# Patient Record
Sex: MALE | Race: WHITE | Employment: FULL TIME | URBAN - METROPOLITAN AREA
[De-identification: names, ages, dates, MRNs, and addresses within clinical notes are randomized per-mention and may not be internally consistent; named-entity substitution may affect disease eponyms.]

---

## 2017-01-16 RX ORDER — LEVOTHYROXINE SODIUM 50 UG/1
50 TABLET ORAL
Qty: 30 TAB | Refills: 0 | Status: SHIPPED | OUTPATIENT
Start: 2017-01-16 | End: 2017-03-03 | Stop reason: SDUPTHER

## 2017-01-16 RX ORDER — LISINOPRIL AND HYDROCHLOROTHIAZIDE 20; 25 MG/1; MG/1
1 TABLET ORAL DAILY
Qty: 30 TAB | Refills: 0 | Status: SHIPPED | OUTPATIENT
Start: 2017-01-16 | End: 2017-03-03 | Stop reason: SDUPTHER

## 2017-02-07 ENCOUNTER — OFFICE VISIT (OUTPATIENT)
Dept: FAMILY MEDICINE CLINIC | Age: 47
End: 2017-02-07

## 2017-02-07 VITALS
DIASTOLIC BLOOD PRESSURE: 73 MMHG | WEIGHT: 287.4 LBS | TEMPERATURE: 98.5 F | RESPIRATION RATE: 20 BRPM | BODY MASS INDEX: 41.14 KG/M2 | OXYGEN SATURATION: 98 % | HEIGHT: 70 IN | SYSTOLIC BLOOD PRESSURE: 109 MMHG | HEART RATE: 68 BPM

## 2017-02-07 DIAGNOSIS — E03.9 ACQUIRED HYPOTHYROIDISM: ICD-10-CM

## 2017-02-07 DIAGNOSIS — R73.01 ELEVATED FASTING GLUCOSE: ICD-10-CM

## 2017-02-07 DIAGNOSIS — I10 ESSENTIAL HYPERTENSION: ICD-10-CM

## 2017-02-07 DIAGNOSIS — S56.911A FOREARM STRAIN, RIGHT, INITIAL ENCOUNTER: Primary | ICD-10-CM

## 2017-02-07 RX ORDER — BISMUTH SUBSALICYLATE 262 MG
1 TABLET,CHEWABLE ORAL DAILY
COMMUNITY

## 2017-02-07 NOTE — MR AVS SNAPSHOT
Visit Information Date & Time Provider Department Dept. Phone Encounter #  
 2/7/2017  3:15 PM [address] [place]  [phone] [id number] Follow-up Instructions Return in about 2 weeks (around 2/21/2017) for hypertension. Upcoming Health Maintenance Date Due Pneumococcal 19-64 Medium Risk (1 of 1 - PPSV23) 4/13/1988 DTaP/Tdap/Td series (1 - Tdap) 4/13/1990 INFLUENZA AGE 9 TO ADULT 8/1/2016 Allergies as of 2/7/2017  Review Complete On: 7/20/2016 By: [id number] Alireza Schneider MD  
  
 Severity Noted Reaction Type Reactions Codeine  12/01/2012    Other (comments) Current Immunizations  Never Reviewed No immunizations on file. Not reviewed this visit You Were Diagnosed With   
  
 Codes Comments Forearm strain, right, initial encounter    -  Primary ICD-10-CM: F69.528K ICD-9-CM: 841.9 Elevated fasting glucose     ICD-10-CM: R73.01 
ICD-9-CM: 790.21 Essential hypertension     ICD-10-CM: I10 
ICD-9-CM: 401.9 Acquired hypothyroidism     ICD-10-CM: E03.9 ICD-9-CM: 757. 9 Vitals BP Pulse Temp Resp Height(growth percentile) Weight(growth percentile) 109/73 (BP 1 Location: Right arm, BP Patient Position: Sitting) 68 98.5 °F (36.9 °C) (Oral) 20 5' 10\" (1.778 m) 287 lb 6.4 oz (130.4 kg) SpO2 BMI Smoking Status 98% 41.24 kg/m2 Never Smoker Vitals History BMI and BSA Data Body Mass Index Body Surface Area  
 41.24 kg/m 2 2.54 m 2 Preferred Pharmacy Pharmacy Name Phone [address], [address] [place] [address] [phone] Your Updated Medication List  
  
   
This list is accurate as of: 2/7/17  3:42 PM.  Always use your most recent med list.  
  
  
  
  
 albuterol 90 mcg/actuation inhaler Commonly known as:  PROAIR HFA Take 2 Puffs by inhalation every four (4) hours as needed for Wheezing or Shortness of Breath. apixaban 5 mg tablet Commonly known as:  Carmen Haver Take 5 mg by mouth two (2) times a day. fluticasone 110 mcg/actuation inhaler Commonly known as:  FLOVENT HFA Take 1 Puff by inhalation every twelve (12) hours. Indications: MAINTENANCE THERAPY FOR ASTHMA  
  
 levothyroxine 50 mcg tablet Commonly known as:  SYNTHROID Take 1 Tab by mouth Daily (before breakfast). lisinopril-hydroCHLOROthiazide 20-25 mg per tablet Commonly known as:  Stella Maximino Take 1 Tab by mouth daily. multivitamin tablet Commonly known as:  ONE A DAY Take 1 Tab by mouth daily. * naltrexone-buPROPion 8-90 mg Tber ER tablet Commonly known as:  Katharina Yaya Week 1 : 1 Tab AM, Week 2 : 1 Tab AM, 1 Tab PM, Week 3 : 2 Tab AM, 1 Tab PM, Week 4 : 2 Tab AM, 2 Tab PM  
  
 * naltrexone-buPROPion 8-90 mg Tber ER tablet Commonly known as:  Katharina [place] Take 2 Tabs by mouth two (2) times a day. * Notice: This list has 2 medication(s) that are the same as other medications prescribed for you. Read the directions carefully, and ask your doctor or other care provider to review them with you. Follow-up Instructions Return in about 2 weeks (around 2/21/2017) for hypertension. To-Do List   
 02/10/2017 Lab:  HEMOGLOBIN A1C WITH EAG   
  
 02/10/2017 Lab:  LIPID PANEL   
  
 02/10/2017 Lab:  METABOLIC PANEL, COMPREHENSIVE   
  
 02/10/2017 Lab:  TSH AND FREE T4 Patient Instructions Please ice the forearm at the elbow 10 minutes twice a day x 1 week, please do not lift greater than 10 pounds in the right arm, use Aleve 1 tab twice a day for the next week with food. Obtain a compression arm sleeve and wear during the day. Follow up in 2 weeks for hypertension exam and to check on the arm Naproxen (By mouth) Naproxen (na-PROX-en) Treats fever and pain. This medicine is an NSAID. Brand Name(s):Aleve, Aleve Arthritis, All Day Pain Relief, All Day Relief, Anaprox, Anaprox DS, EC Naprosyn, Flanax Pain Relief Kit, [facility] All Day Pain Relief, [facility] Sense All Day Pain Relief, Leader All Day Pain Relief, Mediproxen, Naprelan, NaproPak, NaproPax There may be other brand names for this medicine. When This Medicine Should Not Be Used: This medicine is not right for everyone. Do not use it if you had an allergic reaction (including asthma) to naproxen, aspirin, or other NSAID medicine. How to Use This Medicine:  
Liquid, Liquid Filled Capsule, Tablet, Coated Tablet, Long Acting Tablet · Your doctor will tell you how much medicine to use. Do not use more than directed. · Prescription-strength naproxen: This medicine should come with a Medication Guide. Ask your pharmacist for a copy if you do not have one. · Follow the instructions on the medicine label if you are using this medicine without a prescription. · Take this medicine with food or milk so it does not upset your stomach. Drink a full glass of water after each dose. · Delayed-release tablet:  Swallow whole. Do not crush, break, or chew it. · Oral liquid:  Shake well just before you measure the dose. Measure the oral liquid medicine with a marked measuring spoon, oral syringe, or medicine cup. · Missed dose: Take a dose as soon as you remember. If it is almost time for your next dose, wait until then and take a regular dose. Do not take extra medicine to make up for a missed dose. · Store the medicine in a closed container at room temperature, away from heat, moisture, and direct light. Oral liquid:  Do not freeze. Drugs and Foods to Avoid: Ask your doctor or pharmacist before using any other medicine, including over-the-counter medicines, vitamins, and herbal products. · Do not use any other NSAID medicine unless your doctor says it is okay. Some other NSAIDs are aspirin, diclofenac, ibuprofen, or celecoxib. · Some medicines and foods can affect how naproxen works.  Tell your doctor if you are using any of the following: ¨ Blood thinner (such as warfarin) ¨ Steroid medicine (such as hydrocortisone, methylprednisolone, prednisone, prednisolone, dexamethasone) ¨ Blood pressure medicine ¨ Lithium ¨ Methotrexate ¨ Probenecid · Ask your doctor before you use an antacid or stomach medicine. Warnings While Using This Medicine: · Tell your doctor if you are pregnant or breastfeeding. Do not use this medicine during the later part of pregnancy, unless your doctor tells you to. · Tell your doctor if you have kidney disease, liver disease, asthma, high blood pressure, congestive heart failure (CHF), other heart or blood problems, or a history of ulcers or digestion problems. Tell your doctor if you smoke or drink alcohol. · This medicine may cause the following problems: ¨ Bleeding and ulcers in the stomach or intestines ¨ Higher risk of heart attack or stroke ¨ Liver damage ¨ Kidney damage · Call your doctor if symptoms get worse, pain lasts more than 10 days, or fever lasts more than 3 days. · Tell any doctor or dentist who treats that you are using this medicine, especially if you have surgery or a procedure. · This medicine may make you dizzy or drowsy. Do not drive or do anything else that could be dangerous if you are not alert. · Tell any doctor or dentist who treats you that you are using this medicine. This medicine may affect certain medical test results. · Keep all medicine out of the reach of children. Never share your medicine with anyone. Possible Side Effects While Using This Medicine:  
Call your doctor right away if you notice any of these side effects: · Allergic reaction: Itching or hives, swelling in your face or hands, swelling or tingling in your mouth or throat, chest tightness, trouble breathing · Blistering, peeling, red skin rash · Change in how much or how often you urinate · Chest pain, trouble breathing, weakness on one side of your body, severe headache, trouble seeing or talking, pain in your lower leg · Chest pain that may spread, trouble breathing, nausea, unusual sweating, fainting · Dark urine or pale stools, nausea, vomiting, loss of appetite, stomach pain, yellow skin or eyes · Severe stomach pain, vomiting blood, bloody or black, tarry stools · Swelling in your hands, ankles, or feet, rapid weight gain · Unusual bleeding, bruising, or weakness · Vision changes If you notice these less serious side effects, talk with your doctor: · Mild nausea, diarrhea, or constipation · Ringing in your ears, dizziness, headache If you notice other side effects that you think are caused by this medicine, tell your doctor. Call your doctor for medical advice about side effects. You may report side effects to FDA at 2-148-JAL-7819 © 2016 [address] is for End User's use only and may not be sold, redistributed or otherwise used for commercial purposes. The above information is an  only. It is not intended as medical advice for individual conditions or treatments. Talk to your doctor, nurse or pharmacist before following any medical regimen to see if it is safe and effective for you. Introducing [facility]! Dear Oscar Helm: Thank you for requesting a [employer] account. Our records indicate that you already have an active [employer] account. You can access your account anytime at [email or web address]/[employer] Did you know that you can access your hospital and ER discharge instructions at any time in [employer]? You can also review all of your test results from your hospital stay or ER visit. Additional Information If you have questions, please visit the Frequently Asked Questions section of the [employer] website at [email or web address] Remember, [employer] is NOT to be used for urgent needs. For medical emergencies, dial 911. Now available from your iPhone and Android! Please provide this summary of care documentation to your next provider. Your primary care clinician is listed as Kemi Saunders. If you have any questions after today's visit, please call [phone].

## 2017-02-07 NOTE — PROGRESS NOTES
Krish Garcias is a 51 y.o. male in today with c/o right arm pain 4/10 s/p partial fall late December 2016. Learning assessment previously completed; primary language is [place]. 1. Have you been to the ER, urgent care clinic since your last visit? Hospitalized since your last visit? No    2. Have you seen or consulted any other health care providers outside of the [address] [place] [address] since your last visit? Include any pap smears or colon screening.  No

## 2017-02-07 NOTE — PATIENT INSTRUCTIONS
Please ice the forearm at the elbow 10 minutes twice a day x 1 week, please do not lift greater than 10 pounds in the right arm, use Aleve 1 tab twice a day for the next week with food. Obtain a compression arm sleeve and wear during the day. Follow up in 2 weeks for hypertension exam and to check on the arm    Naproxen (By mouth)   Naproxen (na-PROX-en)  Treats fever and pain. This medicine is an NSAID. Brand Name(s):Aleve, Aleve Arthritis, All Day Pain Relief, All Day Relief, Anaprox, Anaprox DS, EC Naprosyn, Flanax Pain Relief Kit, [facility] All Day Pain Relief, [facility] All Day Pain Relief, Leader All Day Pain Relief, Mediproxen, Naprelan, NaproPak, NaproPax   There may be other brand names for this medicine. When This Medicine Should Not Be Used: This medicine is not right for everyone. Do not use it if you had an allergic reaction (including asthma) to naproxen, aspirin, or other NSAID medicine. How to Use This Medicine:   Liquid, Liquid Filled Capsule, Tablet, Coated Tablet, Long Acting Tablet  · Your doctor will tell you how much medicine to use. Do not use more than directed. · Prescription-strength naproxen: This medicine should come with a Medication Guide. Ask your pharmacist for a copy if you do not have one. · Follow the instructions on the medicine label if you are using this medicine without a prescription. · Take this medicine with food or milk so it does not upset your stomach. Drink a full glass of water after each dose. · Delayed-release tablet:  Swallow whole. Do not crush, break, or chew it. · Oral liquid:  Shake well just before you measure the dose. Measure the oral liquid medicine with a marked measuring spoon, oral syringe, or medicine cup. · Missed dose: Take a dose as soon as you remember. If it is almost time for your next dose, wait until then and take a regular dose. Do not take extra medicine to make up for a missed dose.   · Store the medicine in a closed container at room temperature, away from heat, moisture, and direct light. Oral liquid:  Do not freeze. Drugs and Foods to Avoid:   Ask your doctor or pharmacist before using any other medicine, including over-the-counter medicines, vitamins, and herbal products. · Do not use any other NSAID medicine unless your doctor says it is okay. Some other NSAIDs are aspirin, diclofenac, ibuprofen, or celecoxib. · Some medicines and foods can affect how naproxen works. Tell your doctor if you are using any of the following:  ¨ Blood thinner (such as warfarin)  ¨ Steroid medicine (such as hydrocortisone, methylprednisolone, prednisone, prednisolone, dexamethasone)  ¨ Blood pressure medicine  ¨ Lithium  ¨ Methotrexate  ¨ Probenecid  · Ask your doctor before you use an antacid or stomach medicine. Warnings While Using This Medicine:   · Tell your doctor if you are pregnant or breastfeeding. Do not use this medicine during the later part of pregnancy, unless your doctor tells you to. · Tell your doctor if you have kidney disease, liver disease, asthma, high blood pressure, congestive heart failure (CHF), other heart or blood problems, or a history of ulcers or digestion problems. Tell your doctor if you smoke or drink alcohol. · This medicine may cause the following problems:  ¨ Bleeding and ulcers in the stomach or intestines  ¨ Higher risk of heart attack or stroke  ¨ Liver damage  ¨ Kidney damage  · Call your doctor if symptoms get worse, pain lasts more than 10 days, or fever lasts more than 3 days. · Tell any doctor or dentist who treats that you are using this medicine, especially if you have surgery or a procedure. · This medicine may make you dizzy or drowsy. Do not drive or do anything else that could be dangerous if you are not alert. · Tell any doctor or dentist who treats you that you are using this medicine. This medicine may affect certain medical test results.   · Keep all medicine out of the reach of children. Never share your medicine with anyone. Possible Side Effects While Using This Medicine:   Call your doctor right away if you notice any of these side effects:  · Allergic reaction: Itching or hives, swelling in your face or hands, swelling or tingling in your mouth or throat, chest tightness, trouble breathing  · Blistering, peeling, red skin rash  · Change in how much or how often you urinate  · Chest pain, trouble breathing, weakness on one side of your body, severe headache, trouble seeing or talking, pain in your lower leg  · Chest pain that may spread, trouble breathing, nausea, unusual sweating, fainting  · Dark urine or pale stools, nausea, vomiting, loss of appetite, stomach pain, yellow skin or eyes  · Severe stomach pain, vomiting blood, bloody or black, tarry stools  · Swelling in your hands, ankles, or feet, rapid weight gain  · Unusual bleeding, bruising, or weakness  · Vision changes  If you notice these less serious side effects, talk with your doctor:   · Mild nausea, diarrhea, or constipation  · Ringing in your ears, dizziness, headache  If you notice other side effects that you think are caused by this medicine, tell your doctor. Call your doctor for medical advice about side effects. You may report side effects to FDA at 8-092-FDA-2900  © 2016 [address] is for End User's use only and may not be sold, redistributed or otherwise used for commercial purposes. The above information is an  only. It is not intended as medical advice for individual conditions or treatments. Talk to your doctor, nurse or pharmacist before following any medical regimen to see if it is safe and effective for you.

## 2017-02-08 NOTE — PROGRESS NOTES
Arm Injury (right)        HPI: Soraya Elaine is a 51 y.o. male WHITE [place] , right hand dominant,here with right forearm pain after stumbling about 6 weeks ago down some stairs and grabbing the stair rail with the right arm to prevent his fall. He reports the pain has improved but he can still get shooting pains from the base of the thumb up the forearm with simple activities such as pushing a button on his remote control. He has tried to avoid lifting heavy objects with the right arm but has not tried other intervention. Past Medical History   Diagnosis Date    Asthma     [facility]deep venous thrombosis) ([facility]      2005    [facility]     [facility]ism ([facility]      2005; s/p IVC filter which was removed     Subdural hematoma ([facility])        Current Outpatient Prescriptions   Medication Sig    multivitamin (ONE A DAY) tablet Take 1 Tab by mouth daily.  lisinopril-hydroCHLOROthiazide (PRINZIDE, ZESTORETIC) 20-25 mg per tablet Take 1 Tab by mouth daily.  levothyroxine (SYNTHROID) 50 mcg tablet Take 1 Tab by mouth Daily (before breakfast).  fluticasone (FLOVENT HFA) 110 mcg/actuation inhaler Take 1 Puff by inhalation every twelve (12) hours. Indications: MAINTENANCE THERAPY FOR ASTHMA    apixaban (ELIQUIS) 5 mg tablet Take 5 mg by mouth two (2) times a day.  albuterol (PROAIR HFA) 90 mcg/actuation inhaler Take 2 Puffs by inhalation every four (4) hours as needed for Wheezing or Shortness of Breath.  naltrexone-buPROPion (CONTRAVE) 8-90 mg TbER ER tablet Week 1 : 1 Tab AM, Week 2 : 1 Tab AM, 1 Tab PM, Week 3 : 2 Tab AM, 1 Tab PM, Week 4 : 2 Tab AM, 2 Tab PM    naltrexone-buPROPion (CONTRAVE) 8-90 mg TbER ER tablet Take 2 Tabs by mouth two (2) times a day. No current facility-administered medications for this visit.         Allergies   Allergen Reactions    Codeine Other (comments)       Past Surgical History   Procedure Laterality Date    Hx craniotomy  2005    Hx knee arthroscopy Right 1992, 1994       Family History   Problem Relation Age of Onset    Cancer Mother      breast and bone    Heart Disease Paternal Grandfather     Hypertension Paternal Grandfather     Stroke Paternal Grandfather        Social History     Social History    Marital status:      Spouse name: N/A    Number of children: N/A    Years of education: N/A     Occupational History     [employer]     Social History Main Topics    Smoking status: Never Smoker    Smokeless tobacco: Never Used    Alcohol use No    Drug use: No    Sexual activity: Not on file     Other Topics Concern    Not on file     Social History Narrative       Review of Systems   Musculoskeletal: Positive for myalgias. Skin: Negative for rash. Visit Vitals    /73 (BP 1 Location: Right arm, BP Patient Position: Sitting)    Pulse 68    Temp 98.5 °F (36.9 °C) (Oral)    Resp 20    Ht 5' 10\" (1.778 m)    Wt 287 lb 6.4 oz (130.4 kg)    SpO2 98%    BMI 41.24 kg/m2       Physical Exam   Constitutional: He appears well-developed and well-nourished. No distress. HENT:   Head: Normocephalic and atraumatic. Right Ear: External ear normal.   Left Ear: External ear normal.   Musculoskeletal:        Right forearm: He exhibits tenderness (mild tenderness distal to medial epicondyle, some muscular tenderness of forearm). He exhibits no bony tenderness, no swelling, no edema and no deformity. Bilateral wrist flexion/extension 5/5,  strength 5/5 bilaterally    Normal supination and pronation of forearm, katherin increase in pain in forearm with opposed pronation of forearm. Skin: Skin is warm and dry. He is not diaphoretic. Psychiatric: He has a normal mood and affect. Nursing note and vitals reviewed.       Lab Results   Component Value Date/Time    Sodium 139 07/15/2016 07:40 AM    Potassium 3.8 07/15/2016 07:40 AM    Chloride 105 07/15/2016 07:40 AM    CO2 28 07/15/2016 07:40 AM    Anion gap 6 07/15/2016 07:40 AM    Glucose 110 07/15/2016 07:40 AM    BUN 13 07/15/2016 07:40 AM    Creatinine 0.90 07/15/2016 07:40 AM    BUN/Creatinine ratio 14 07/15/2016 07:40 AM    GFR est AA >60 07/15/2016 07:40 AM    GFR est non-AA >60 07/15/2016 07:40 AM    Calcium 9.0 07/15/2016 07:40 AM    Bilirubin, total 0.4 07/15/2016 07:40 AM    AST (SGOT) 29 07/15/2016 07:40 AM    Alk. phosphatase 60 07/15/2016 07:40 AM    Protein, total 7.1 07/15/2016 07:40 AM    Albumin 3.9 07/15/2016 07:40 AM    Globulin 3.2 07/15/2016 07:40 AM    A-G Ratio 1.2 07/15/2016 07:40 AM    ALT (SGPT) 64 07/15/2016 07:40 AM       Assesment:  1. Forearm strain, right, initial encounter  No heavy lifting, nothing greater than 10 pounds, Aleve BID x 1 week, consider compression sleeve    2. Elevated fasting glucose  Elevated blood sugar on last CMP, patient did not return for follow up  - HEMOGLOBIN A1C WITH EAG; Future    3. Essential hypertension  Routine labs due  - METABOLIC PANEL, COMPREHENSIVE; Future  - LIPID PANEL; Future    4. Acquired hypothyroidism  Routine lab due  - TSH AND FREE T4; Future        I have discussed the diagnosis with the patient and the intended management  The patient has received an after-visit summary and questions were answered concerning future plans. I have discussed medication usage, side effects and warnings with the patient as well. I have reviewed the plan of care with the patient, accepted their input and they are in agreement with the treatment goals. Follow-up Disposition:  Return in about 2 weeks (around 2/21/2017) for hypertension.       Kemi Saunders MD                .

## 2017-02-13 ENCOUNTER — CLINICAL SUPPORT (OUTPATIENT)
Dept: FAMILY MEDICINE CLINIC | Age: 47
End: 2017-02-13

## 2017-02-13 ENCOUNTER — HOSPITAL ENCOUNTER (OUTPATIENT)
Dept: LAB | Age: 47
Discharge: HOME | End: 2017-02-13
Payer: COMMERCIAL

## 2017-02-13 DIAGNOSIS — E03.9 ACQUIRED HYPOTHYROIDISM: ICD-10-CM

## 2017-02-13 DIAGNOSIS — R73.01 ELEVATED FASTING GLUCOSE: ICD-10-CM

## 2017-02-13 DIAGNOSIS — I10 ESSENTIAL HYPERTENSION: ICD-10-CM

## 2017-02-13 DIAGNOSIS — D68.59 HYPERCOAGULABLE STATE (HCC): Primary | Chronic | ICD-10-CM

## 2017-02-13 LAB
ALBUMIN SERPL BCP-MCNC: 4 G/DL (ref 3.4–5)
ALBUMIN/GLOB SERPL: 1.4 {RATIO} (ref 0.8–1.7)
ALP SERPL-CCNC: 65 U/L (ref 45–117)
ALT SERPL-CCNC: 67 U/L (ref 16–61)
ANION GAP BLD CALC-SCNC: 10 MMOL/L (ref 3–18)
AST SERPL W P-5'-P-CCNC: 34 U/L (ref 15–37)
BILIRUB SERPL-MCNC: 0.3 MG/DL (ref 0.2–1)
BUN SERPL-MCNC: 13 MG/DL (ref 7–18)
BUN/CREAT SERPL: 13 (ref 12–20)
CALCIUM SERPL-MCNC: 8.6 MG/DL (ref 8.5–10.1)
CHLORIDE SERPL-SCNC: 105 MMOL/L (ref 100–108)
CHOLEST SERPL-MCNC: 172 MG/DL
CO2 SERPL-SCNC: 26 MMOL/L (ref 21–32)
CREAT SERPL-MCNC: 0.97 MG/DL (ref 0.6–1.3)
EST. AVERAGE GLUCOSE BLD GHB EST-MCNC: 131 MG/DL
GLOBULIN SER CALC-MCNC: 2.9 G/DL (ref 2–4)
GLUCOSE SERPL-MCNC: 96 MG/DL (ref 74–99)
HBA1C MFR BLD: 6.2 % (ref 4.2–5.6)
HDLC SERPL-MCNC: 41 MG/DL (ref 40–60)
HDLC SERPL: 4.2 {RATIO} (ref 0–5)
LDLC SERPL CALC-MCNC: 106.4 MG/DL (ref 0–100)
LIPID PROFILE,FLP: ABNORMAL
POTASSIUM SERPL-SCNC: 3.7 MMOL/L (ref 3.5–5.5)
PROT SERPL-MCNC: 6.9 G/DL (ref 6.4–8.2)
SODIUM SERPL-SCNC: 141 MMOL/L (ref 136–145)
T4 FREE SERPL-MCNC: 1 NG/DL (ref 0.7–1.5)
TRIGL SERPL-MCNC: 123 MG/DL (ref ?–150)
TSH SERPL DL<=0.05 MIU/L-ACNC: 2.53 UIU/ML (ref 0.36–3.74)
VLDLC SERPL CALC-MCNC: 24.6 MG/DL

## 2017-02-13 PROCEDURE — 80061 LIPID PANEL: CPT | Performed by: FAMILY MEDICINE

## 2017-02-13 PROCEDURE — 84439 ASSAY OF FREE THYROXINE: CPT | Performed by: FAMILY MEDICINE

## 2017-02-13 PROCEDURE — 80053 COMPREHEN METABOLIC PANEL: CPT | Performed by: FAMILY MEDICINE

## 2017-02-13 PROCEDURE — 83036 HEMOGLOBIN GLYCOSYLATED A1C: CPT | Performed by: FAMILY MEDICINE

## 2017-07-21 ENCOUNTER — TELEPHONE (OUTPATIENT)
Dept: FAMILY MEDICINE CLINIC | Age: 47
End: 2017-07-21

## 2017-07-21 NOTE — TELEPHONE ENCOUNTER
Called, patient at work, spoke with wife, left message for patient to call and schedule follow-up per provider's request.

## 2017-08-16 ENCOUNTER — OFFICE VISIT (OUTPATIENT)
Dept: FAMILY MEDICINE CLINIC | Age: 47
End: 2017-08-16

## 2017-08-16 ENCOUNTER — HOSPITAL ENCOUNTER (OUTPATIENT)
Dept: LAB | Age: 47
Discharge: HOME | End: 2017-08-16

## 2017-08-16 VITALS
WEIGHT: 293.8 LBS | TEMPERATURE: 99.3 F | RESPIRATION RATE: 16 BRPM | BODY MASS INDEX: 42.06 KG/M2 | HEIGHT: 70 IN | DIASTOLIC BLOOD PRESSURE: 85 MMHG | HEART RATE: 52 BPM | OXYGEN SATURATION: 98 % | SYSTOLIC BLOOD PRESSURE: 139 MMHG

## 2017-08-16 DIAGNOSIS — M54.50 CHRONIC LOW BACK PAIN WITHOUT SCIATICA, UNSPECIFIED BACK PAIN LATERALITY: ICD-10-CM

## 2017-08-16 DIAGNOSIS — R06.2 WHEEZING: ICD-10-CM

## 2017-08-16 DIAGNOSIS — G89.29 CHRONIC LOW BACK PAIN WITHOUT SCIATICA, UNSPECIFIED BACK PAIN LATERALITY: ICD-10-CM

## 2017-08-16 DIAGNOSIS — E03.9 ACQUIRED HYPOTHYROIDISM: ICD-10-CM

## 2017-08-16 DIAGNOSIS — I10 ESSENTIAL HYPERTENSION: ICD-10-CM

## 2017-08-16 DIAGNOSIS — J45.30 MILD PERSISTENT ASTHMA WITHOUT COMPLICATION: ICD-10-CM

## 2017-08-16 DIAGNOSIS — I10 ESSENTIAL HYPERTENSION: Primary | ICD-10-CM

## 2017-08-16 RX ORDER — LEVOTHYROXINE SODIUM 50 UG/1
50 TABLET ORAL
Qty: 90 TAB | Refills: 1 | Status: SHIPPED | OUTPATIENT
Start: 2017-08-16 | End: 2018-02-21

## 2017-08-16 RX ORDER — IBUPROFEN 800 MG/1
TABLET ORAL
COMMUNITY
End: 2017-08-16 | Stop reason: ALTCHOICE

## 2017-08-16 RX ORDER — LISINOPRIL AND HYDROCHLOROTHIAZIDE 20; 25 MG/1; MG/1
1 TABLET ORAL DAILY
Qty: 90 TAB | Refills: 1 | Status: SHIPPED | OUTPATIENT
Start: 2017-08-16 | End: 2018-01-17 | Stop reason: SDUPTHER

## 2017-08-16 RX ORDER — NAPROXEN SODIUM 220 MG
220 TABLET ORAL 2 TIMES DAILY WITH MEALS
COMMUNITY
End: 2017-08-16 | Stop reason: ALTCHOICE

## 2017-08-16 RX ORDER — FLUTICASONE PROPIONATE 110 UG/1
1 AEROSOL, METERED RESPIRATORY (INHALATION) EVERY 12 HOURS
Qty: 1 INHALER | Refills: 5 | Status: SHIPPED | OUTPATIENT
Start: 2017-08-16

## 2017-08-16 RX ORDER — ALBUTEROL SULFATE 90 UG/1
2 AEROSOL, METERED RESPIRATORY (INHALATION)
Qty: 1 INHALER | Refills: 5 | Status: SHIPPED | OUTPATIENT
Start: 2017-08-16

## 2017-08-16 RX ORDER — MELOXICAM 15 MG/1
15 TABLET ORAL DAILY
Qty: 30 TAB | Refills: 1 | Status: SHIPPED | OUTPATIENT
Start: 2017-08-16 | End: 2018-02-21

## 2017-08-16 NOTE — MR AVS SNAPSHOT
Visit Information Date & Time Provider Department Dept. Phone Encounter #  
 8/16/2017  3:30 PM Ronny Walker, [address] [phone] [id number] Follow-up Instructions Return in about 3 months (around 11/16/2017) for hypertension. Upcoming Health Maintenance Date Due DTaP/Tdap/Td series (1 - Tdap) 4/13/1990 INFLUENZA AGE 9 TO ADULT 8/1/2017 Allergies as of 8/16/2017  Review Complete On: 8/16/2017 By: Renée Rosas LPN Severity Noted Reaction Type Reactions Codeine  11/03/2008    Other (comments), Hives Current Immunizations  Never Reviewed No immunizations on file. Not reviewed this visit You Were Diagnosed With   
  
 Codes Comments Essential hypertension    -  Primary ICD-10-CM: I10 
ICD-9-CM: 401.9 Chronic low back pain without sciatica, unspecified back pain laterality     ICD-10-CM: M54.5, G89.29 ICD-9-CM: 724.2, 338.29 Wheezing     ICD-10-CM: R06.2 ICD-9-CM: 786.07   
 Mild persistent asthma without complication     GBU-43-EH: J45.30 ICD-9-CM: 493.90 Acquired hypothyroidism     ICD-10-CM: E03.9 ICD-9-CM: 568. 9 Vitals BP Pulse Temp Resp Height(growth percentile) Weight(growth percentile) 139/85 (BP 1 Location: Left arm, BP Patient Position: Sitting) (!) 52 99.3 °F (37.4 °C) (Oral) 16 5' 10\" (1.778 m) 293 lb 12.8 oz (133.3 kg) SpO2 BMI Smoking Status 98% 42.16 kg/m2 Never Smoker Vitals History BMI and BSA Data Body Mass Index Body Surface Area  
 42.16 kg/m 2 2.57 m 2 Preferred Pharmacy Pharmacy Name Phone [facility] [address] [place] [address], [address] [place] [address] [phone] Your Updated Medication List  
  
   
This list is accurate as of: 8/16/17  4:14 PM.  Always use your most recent med list.  
  
  
  
  
 albuterol 90 mcg/actuation inhaler Commonly known as:  PROAIR HFA  
 Take 2 Puffs by inhalation every four (4) hours as needed for Wheezing or Shortness of Breath. apixaban 5 mg tablet Commonly known as:  Lizzy [place] Take 5 mg by mouth two (2) times a day. fluticasone 110 mcg/actuation inhaler Commonly known as:  FLOVENT HFA Take 1 Puff by inhalation every twelve (12) hours. Indications: MAINTENANCE THERAPY FOR ASTHMA  
  
 levothyroxine 50 mcg tablet Commonly known as:  SYNTHROID Take 1 Tab by mouth Daily (before breakfast). lisinopril-hydroCHLOROthiazide 20-25 mg per tablet Commonly known as:  Janas [place]ukes Take 1 Tab by mouth daily. meloxicam 15 mg tablet Commonly known as:  MOBIC Take 1 Tab by mouth daily. multivitamin tablet Commonly known as:  ONE A DAY Take 1 Tab by mouth daily. Prescriptions Sent to Pharmacy Refills  
 levothyroxine (SYNTHROID) 50 mcg tablet 1 Sig: Take 1 Tab by mouth Daily (before breakfast). Class: Normal  
 Pharmacy: [address] Ph #: [phone] Route: Oral  
 albuterol (PROAIR HFA) 90 mcg/actuation inhaler 5 Sig: Take 2 Puffs by inhalation every four (4) hours as needed for Wheezing or Shortness of Breath. Class: Normal  
 Pharmacy: [address] Ph #: [phone] Route: Inhalation  
 lisinopril-hydroCHLOROthiazide (PRINZIDE, ZESTORETIC) 20-25 mg per tablet 1 Sig: Take 1 Tab by mouth daily. Class: Normal  
 Pharmacy: [address] Ph #: [phone] Route: Oral  
 fluticasone (FLOVENT HFA) 110 mcg/actuation inhaler 5 Sig: Take 1 Puff by inhalation every twelve (12) hours. Indications: MAINTENANCE THERAPY FOR ASTHMA Class: Normal  
 Pharmacy: [address] Ph #: [phone]  Route: Inhalation  
 meloxicam (MOBIC) 15 mg tablet 1  
 Sig: Take 1 Tab by mouth daily. Class: Normal  
 Pharmacy: [facility] [address] [facility], [address]  #: [phone] Route: Oral  
  
Follow-up Instructions Return in about 3 months (around 11/16/2017) for hypertension. To-Do List   
 08/16/2017 Lab:  METABOLIC PANEL, COMPREHENSIVE   
  
 08/16/2017 Lab:  TSH AND FREE T4 Patient Instructions Back Stretches: Exercises Your Care Instructions Here are some examples of exercises for stretching your back. Start each exercise slowly. Ease off the exercise if you start to have pain. Your doctor or physical therapist will tell you when you can start these exercises and which ones will work best for you. How to do the exercises Overhead stretch 1. Stand comfortably with your feet shoulder-width apart. 2. Looking straight ahead, raise both arms over your head and reach toward the ceiling. Do not allow your head to tilt back. 3. Hold for 15 to 30 seconds, then lower your arms to your sides. 4. Repeat 2 to 4 times. Side stretch 1. Stand comfortably with your feet shoulder-width apart. 2. Raise one arm over your head, and then lean to the other side. 3. Slide your hand down your leg as you let the weight of your arm gently stretch your side muscles. Hold for 15 to 30 seconds. 4. Repeat 2 to 4 times on each side. Press-up 1. Lie on your stomach, supporting your body with your forearms. 2. Press your elbows down into the floor to raise your upper back. As you do this, relax your stomach muscles and allow your back to arch without using your back muscles. As your press up, do not let your hips or pelvis come off the floor. 3. Hold for 15 to 30 seconds, then relax. 4. Repeat 2 to 4 times. Relax and rest 
 
1. Lie on your back with a rolled towel under your neck and a pillow under your knees. Extend your arms comfortably to your sides. 2. Relax and breathe normally. 3. Remain in this position for about 10 minutes. 4. If you can, do this 2 or 3 times each day. Follow-up care is a key part of your treatment and safety. Be sure to make and go to all appointments, and call your doctor if you are having problems. It's also a good idea to know your test results and keep a list of the medicines you take. Where can you learn more? Go to http://ilir-gustavo.info/. Enter X781 in the search box to learn more about \"Back Stretches: Exercises. \" Current as of: March 21, 2017 Content Version: 11.3 © 1986-4150 [employer]. Care instructions adapted under license by [employer] (which disclaims liability or warranty for this information). If you have questions about a medical condition or this instruction, always ask your healthcare professional. [address] any warranty or liability for your use of this information. Introducing [facility]! Dear Nate Patterson: Thank you for requesting a Pearlfection account. Our records indicate that you already have an active Pearlfection account. You can access your account anytime at [email or web address] Did you know that you can access your hospital and ER discharge instructions at any time in Pearlfection? You can also review all of your test results from your hospital stay or ER visit. Additional Information If you have questions, please visit the Frequently Asked Questions section of the Pearlfection website at [email or web address] Remember, Pearlfection is NOT to be used for urgent needs. For medical emergencies, dial 911. Now available from your iPhone and Android! Please provide this summary of care documentation to your next provider. Your primary care clinician is listed as Suezanne Hamman. If you have any questions after today's visit, please call [phone].

## 2017-08-16 NOTE — PATIENT INSTRUCTIONS

## 2017-08-17 ENCOUNTER — TELEPHONE (OUTPATIENT)
Dept: FAMILY MEDICINE CLINIC | Age: 47
End: 2017-08-17

## 2017-08-17 NOTE — TELEPHONE ENCOUNTER
Pharmacists calling with concerns of new Mobic rx while patient is currently on Eliquis. Please advise.

## 2017-08-17 NOTE — TELEPHONE ENCOUNTER
Yes I am aware of the risk. Patient advised of risk. Patient was taking Aleve and Ibuprofen previously regularly.     Thanks,  Francesca Sanchez MD

## 2017-08-20 PROBLEM — M54.50 CHRONIC LOW BACK PAIN WITHOUT SCIATICA: Status: ACTIVE | Noted: 2017-08-20

## 2017-08-20 PROBLEM — G89.29 CHRONIC LOW BACK PAIN WITHOUT SCIATICA: Status: ACTIVE | Noted: 2017-08-20

## 2017-08-20 PROBLEM — I10 ESSENTIAL HYPERTENSION: Status: ACTIVE | Noted: 2017-08-20

## 2017-08-20 NOTE — PROGRESS NOTES
Hypertension        HPI: Bhargavi Moreno is a 49 y.o. male WHITE [place]   Here in follow up of his HTN. He has been off of his medcation for about 1 month now. He did not call in for refill. He does maintain a low salt diet. He does try to stay active. He does not engage in regular exercise. He has not used his thyroid hormone in the last 1 month. He has not noticed any change in how he feels    He reports recurrent low back pain without radiation. He has been using ibuprofen and aleve with minimal relief. Past Medical History:   Diagnosis Date    Asthma     [facility] thrombosis) ([facility]     2005    Hypercoagulable state ([facility]     [facility]     2005; s/p IVC filter which was removed     Subdural hematoma ([facility])        Current Outpatient Prescriptions   Medication Sig    levothyroxine (SYNTHROID) 50 mcg tablet Take 1 Tab by mouth Daily (before breakfast).  albuterol (PROAIR HFA) 90 mcg/actuation inhaler Take 2 Puffs by inhalation every four (4) hours as needed for Wheezing or Shortness of Breath.  lisinopril-hydroCHLOROthiazide (PRINZIDE, ZESTORETIC) 20-25 mg per tablet Take 1 Tab by mouth daily.  fluticasone (FLOVENT HFA) 110 mcg/actuation inhaler Take 1 Puff by inhalation every twelve (12) hours. Indications: MAINTENANCE THERAPY FOR ASTHMA    meloxicam (MOBIC) 15 mg tablet Take 1 Tab by mouth daily.  multivitamin (ONE A DAY) tablet Take 1 Tab by mouth daily.  apixaban (ELIQUIS) 5 mg tablet Take 5 mg by mouth two (2) times a day. No current facility-administered medications for this visit.         Allergies   Allergen Reactions    Codeine Other (comments) and Hives       Past Surgical History:   Procedure Laterality Date    HX CRANIOTOMY  2005    HX KNEE ARTHROSCOPY Right 1992, 1994       Family History   Problem Relation Age of Onset    Cancer Mother      breast and bone    Heart Disease Paternal Grandfather     Hypertension Paternal Grandfather     Stroke Paternal Grandfather        Social History     Social History    Marital status:      Spouse name: N/A    Number of children: N/A    Years of education: N/A     Occupational History     [address]     Social History Main Topics    Smoking status: Never Smoker    Smokeless tobacco: Never Used    Alcohol use No    Drug use: No    Sexual activity: Not on file     Other Topics Concern    Not on file     Social History Narrative       Gen- No weight loss, No Malaise, No fatigue  Eyes- No dipoplia, blurry vision  CVS- No Chest pain, no palpitations, no edema  Resp- No Cough, SOB, OJEDA, Wheezing  Neuro- No headaches  Skin- No easy bruising, No rashes      Visit Vitals    /85 (BP 1 Location: Left arm, BP Patient Position: Sitting)    Pulse (!) 52    Temp 99.3 °F (37.4 °C) (Oral)    Resp 16    Ht 5' 10\" (1.778 m)    Wt 293 lb 12.8 oz (133.3 kg)    SpO2 98%    BMI 42.16 kg/m2       GEN- NAD, AAOx3  CVS- RRR, +S1, +S2, No Murmurs, No JVD  PULM- faint wheezing with forced expiration, otherwise normal exam  EXT- No edema  MSK- Lumbar spine- no bony TTP, no muscle spasms noted  NEURO-Normal Gait  PSYCH- Euthymic, normal afffect        Assesment:  1. Essential hypertension  Controlled. High end of normal. Prinzide refilled today. - METABOLIC PANEL, COMPREHENSIVE; Future    2. Chronic low back pain without sciatica, unspecified back pain laterality  Stop use of ibuprofen and Aleve. Use meloxicam PRN when pain severe. Patient advised of risk of bleeding  - meloxicam (MOBIC) 15 mg tablet; Take 1 Tab by mouth daily. Dispense: 30 Tab; Refill: 1    3. Wheezing  - albuterol (PROAIR HFA) 90 mcg/actuation inhaler; Take 2 Puffs by inhalation every four (4) hours as needed for Wheezing or Shortness of Breath. Dispense: 1 Inhaler; Refill: 5    4. Mild persistent asthma without complication  Feeling well.  Continue flovent  - fluticasone (FLOVENT HFA) 110 mcg/actuation inhaler; Take 1 Puff by inhalation every twelve (12) hours. Indications: MAINTENANCE THERAPY FOR ASTHMA  Dispense: 1 Inhaler; Refill: 5    5. Acquired hypothyroidism  Recheck labwork today to assess need for thyroid hormone  - levothyroxine (SYNTHROID) 50 mcg tablet; Take 1 Tab by mouth Daily (before breakfast). Dispense: 90 Tab; Refill: 1  - TSH AND FREE T4; Future        I have discussed the diagnosis with the patient and the intended management  The patient has received an after-visit summary and questions were answered concerning future plans. I have discussed medication usage, side effects and warnings with the patient as well. I have reviewed the plan of care with the patient, accepted their input and they are in agreement with the treatment goals. Follow-up Disposition:  Return in about 3 months (around 11/16/2017) for hypertension.       Mary Schuler MD                .

## 2017-08-23 ENCOUNTER — HOSPITAL ENCOUNTER (OUTPATIENT)
Dept: LAB | Age: 47
Discharge: HOME | End: 2017-08-23
Payer: COMMERCIAL

## 2017-08-23 ENCOUNTER — LAB ONLY (OUTPATIENT)
Dept: FAMILY MEDICINE CLINIC | Age: 47
End: 2017-08-23

## 2017-08-23 DIAGNOSIS — I10 ESSENTIAL HYPERTENSION: Primary | ICD-10-CM

## 2017-08-23 DIAGNOSIS — E03.9 ACQUIRED HYPOTHYROIDISM: ICD-10-CM

## 2017-08-23 DIAGNOSIS — I10 ESSENTIAL HYPERTENSION: ICD-10-CM

## 2017-08-23 LAB
ALBUMIN SERPL-MCNC: 3.9 G/DL (ref 3.4–5)
ALBUMIN/GLOB SERPL: 1.3 {RATIO} (ref 0.8–1.7)
ALP SERPL-CCNC: 60 U/L (ref 45–117)
ALT SERPL-CCNC: 61 U/L (ref 16–61)
ANION GAP SERPL CALC-SCNC: 8 MMOL/L (ref 3–18)
AST SERPL-CCNC: 29 U/L (ref 15–37)
BILIRUB SERPL-MCNC: 0.3 MG/DL (ref 0.2–1)
BUN SERPL-MCNC: 17 MG/DL (ref 7–18)
BUN/CREAT SERPL: 19 (ref 12–20)
CALCIUM SERPL-MCNC: 8.5 MG/DL (ref 8.5–10.1)
CHLORIDE SERPL-SCNC: 105 MMOL/L (ref 100–108)
CO2 SERPL-SCNC: 27 MMOL/L (ref 21–32)
CREAT SERPL-MCNC: 0.88 MG/DL (ref 0.6–1.3)
GLOBULIN SER CALC-MCNC: 3 G/DL (ref 2–4)
GLUCOSE SERPL-MCNC: 132 MG/DL (ref 74–99)
POTASSIUM SERPL-SCNC: 3.8 MMOL/L (ref 3.5–5.5)
PROT SERPL-MCNC: 6.9 G/DL (ref 6.4–8.2)
SODIUM SERPL-SCNC: 140 MMOL/L (ref 136–145)

## 2017-08-23 PROCEDURE — 80053 COMPREHEN METABOLIC PANEL: CPT | Performed by: FAMILY MEDICINE

## 2017-08-23 PROCEDURE — 84439 ASSAY OF FREE THYROXINE: CPT | Performed by: FAMILY MEDICINE

## 2017-08-23 NOTE — PROGRESS NOTES
Margi Durán is a 49 y.o. male in today for labs only. Patient tolerated well, in no apparent distress.

## 2017-08-25 LAB
T4 FREE SERPL-MCNC: 0.8 NG/DL (ref 0.7–1.5)
TSH SERPL DL<=0.05 MIU/L-ACNC: 2.66 UIU/ML (ref 0.36–3.74)

## 2017-11-20 ENCOUNTER — OFFICE VISIT (OUTPATIENT)
Dept: FAMILY MEDICINE CLINIC | Age: 47
End: 2017-11-20

## 2017-11-20 ENCOUNTER — HOSPITAL ENCOUNTER (OUTPATIENT)
Dept: LAB | Age: 47
Discharge: HOME | End: 2017-11-20
Payer: COMMERCIAL

## 2017-11-20 VITALS
OXYGEN SATURATION: 98 % | HEART RATE: 76 BPM | TEMPERATURE: 99.5 F | SYSTOLIC BLOOD PRESSURE: 124 MMHG | BODY MASS INDEX: 41.66 KG/M2 | HEIGHT: 70 IN | WEIGHT: 291 LBS | DIASTOLIC BLOOD PRESSURE: 78 MMHG | RESPIRATION RATE: 18 BRPM

## 2017-11-20 DIAGNOSIS — Z86.39 HISTORY OF HYPOTHYROIDISM: ICD-10-CM

## 2017-11-20 DIAGNOSIS — J20.9 ACUTE BRONCHITIS, UNSPECIFIED ORGANISM: ICD-10-CM

## 2017-11-20 DIAGNOSIS — Z23 ENCOUNTER FOR IMMUNIZATION: ICD-10-CM

## 2017-11-20 DIAGNOSIS — I10 ESSENTIAL HYPERTENSION: Primary | ICD-10-CM

## 2017-11-20 LAB
T4 FREE SERPL-MCNC: 0.9 NG/DL (ref 0.7–1.5)
TSH SERPL DL<=0.05 MIU/L-ACNC: 2.18 UIU/ML (ref 0.36–3.74)

## 2017-11-20 PROCEDURE — 84439 ASSAY OF FREE THYROXINE: CPT | Performed by: FAMILY MEDICINE

## 2017-11-20 RX ORDER — AZITHROMYCIN 250 MG/1
TABLET, FILM COATED ORAL
Qty: 6 TAB | Refills: 0 | Status: SHIPPED | OUTPATIENT
Start: 2017-11-20 | End: 2017-11-25

## 2017-11-20 NOTE — PATIENT INSTRUCTIONS
Please make sure to get lots of rest, drink plenty of fluids at least eight 8-ounce glasses daily. Please start Vitamin C supplement of 1000mg daily and a daily multivitamin with zinc in it or a zinc supplementation. Use warm mist vaporizer/ humidifier and Vicks vaporub around the nose to help open up your nasal passages. Delsym is a very good cough syrup. Use ibuprofen as needed for pain and fevers. Please follow up if you are not improving in 1 week or if your symptoms change. DASH Diet: Care Instructions  Your Care Instructions    The DASH diet is an eating plan that can help lower your blood pressure. DASH stands for Dietary Approaches to Stop Hypertension. Hypertension is high blood pressure. The DASH diet focuses on eating foods that are high in calcium, potassium, and magnesium. These nutrients can lower blood pressure. The foods that are highest in these nutrients are fruits, vegetables, low-fat dairy products, nuts, seeds, and legumes. But taking calcium, potassium, and magnesium supplements instead of eating foods that are high in those nutrients does not have the same effect. The DASH diet also includes whole grains, fish, and poultry. The DASH diet is one of several lifestyle changes your doctor may recommend to lower your high blood pressure. Your doctor may also want you to decrease the amount of sodium in your diet. Lowering sodium while following the DASH diet can lower blood pressure even further than just the DASH diet alone. Follow-up care is a key part of your treatment and safety. Be sure to make and go to all appointments, and call your doctor if you are having problems. It's also a good idea to know your test results and keep a list of the medicines you take. How can you care for yourself at home? Following the DASH diet  · Eat 4 to 5 servings of fruit each day.  A serving is 1 medium-sized piece of fruit, ½ cup chopped or canned fruit, 1/4 cup dried fruit, or 4 ounces (½ cup) of fruit juice. Choose fruit more often than fruit juice. · Eat 4 to 5 servings of vegetables each day. A serving is 1 cup of lettuce or raw leafy vegetables, ½ cup of chopped or cooked vegetables, or 4 ounces (½ cup) of vegetable juice. Choose vegetables more often than vegetable juice. · Get 2 to 3 servings of low-fat and fat-free dairy each day. A serving is 8 ounces of milk, 1 cup of yogurt, or 1 ½ ounces of cheese. · Eat 6 to 8 servings of grains each day. A serving is 1 slice of bread, 1 ounce of dry cereal, or ½ cup of cooked rice, pasta, or cooked cereal. Try to choose whole-grain products as much as possible. · Limit lean meat, poultry, and fish to 2 servings each day. A serving is 3 ounces, about the size of a deck of cards. · Eat 4 to 5 servings of nuts, seeds, and legumes (cooked dried beans, lentils, and split peas) each week. A serving is 1/3 cup of nuts, 2 tablespoons of seeds, or ½ cup of cooked beans or peas. · Limit fats and oils to 2 to 3 servings each day. A serving is 1 teaspoon of vegetable oil or 2 tablespoons of salad dressing. · Limit sweets and added sugars to 5 servings or less a week. A serving is 1 tablespoon jelly or jam, ½ cup sorbet, or 1 cup of lemonade. · Eat less than 2,300 milligrams (mg) of sodium a day. If you limit your sodium to 1,500 mg a day, you can lower your blood pressure even more. Tips for success  · Start small. Do not try to make dramatic changes to your diet all at once. You might feel that you are missing out on your favorite foods and then be more likely to not follow the plan. Make small changes, and stick with them. Once those changes become habit, add a few more changes. · Try some of the following:  ¨ Make it a goal to eat a fruit or vegetable at every meal and at snacks. This will make it easy to get the recommended amount of fruits and vegetables each day. ¨ Try yogurt topped with fruit and nuts for a snack or healthy dessert.   ¨ Add lettuce, tomato, cucumber, and onion to sandwiches. ¨ Combine a ready-made pizza crust with low-fat mozzarella cheese and lots of vegetable toppings. Try using tomatoes, squash, spinach, broccoli, carrots, cauliflower, and onions. ¨ Have a variety of cut-up vegetables with a low-fat dip as an appetizer instead of chips and dip. ¨ Sprinkle sunflower seeds or chopped almonds over salads. Or try adding chopped walnuts or almonds to cooked vegetables. ¨ Try some vegetarian meals using beans and peas. Add garbanzo or kidney beans to salads. Make burritos and tacos with mashed dyson beans or black beans. Where can you learn more? Go to http://ilir-gustavo.info/. Enter G583 in the search box to learn more about \"DASH Diet: Care Instructions. \"  Current as of: September 21, 2016  Content Version: 11.4  © 3611-6859 [employer]wise, [employer]. Care instructions adapted under license by [employer] (which disclaims liability or warranty for this information). If you have questions about a medical condition or this instruction, always ask your healthcare professional. [place] [address] any warranty or liability for your use of this information.

## 2017-11-20 NOTE — MR AVS SNAPSHOT
Visit Information Date & Time Provider Department Dept. Phone Encounter #  
 11/20/2017  9:00 AM [address], [facility] [employer] [phone] [id number] Upcoming Health Maintenance Date Due Pneumococcal 19-64 Medium Risk (1 of 1 - PPSV23) 4/13/1988 DTaP/Tdap/Td series (1 - Tdap) 4/13/1990 Influenza Age 5 to Adult 8/1/2017 Allergies as of 11/20/2017  Review Complete On: 11/20/2017 By: Amberly Brumfield LPN Severity Noted Reaction Type Reactions Codeine  11/03/2008    Other (comments), Hives Current Immunizations  Reviewed on 11/20/2017 Name Date Influenza Vaccine 10/1/2011 12:00 AM, 11/10/2010 12:00 AM  
 Influenza Vaccine (Quad) PF 11/20/2017  9:53 AM  
  
 Reviewed by Amberly Brumfield LPN on 18/55/0854 at  9:57 AM  
You Were Diagnosed With   
  
 Codes Comments Essential hypertension    -  Primary ICD-10-CM: I10 
ICD-9-CM: 401.9 Acute bronchitis, unspecified organism     ICD-10-CM: J20.9 ICD-9-CM: 466.0 History of hypothyroidism     ICD-10-CM: Z86.39 
ICD-9-CM: V12.29 Encounter for immunization     ICD-10-CM: T77 ICD-9-CM: V03.89 Vitals BP Pulse Temp Resp Height(growth percentile) Weight(growth percentile) 124/78 (BP 1 Location: Left arm, BP Patient Position: Sitting) 76 99.5 °F (37.5 °C) (Oral) 18 5' 10\" (1.778 m) 291 lb (132 kg) SpO2 BMI Smoking Status 98% 41.75 kg/m2 Never Smoker Vitals History BMI and BSA Data Body Mass Index Body Surface Area 41.75 kg/m 2 2.55 m 2 Preferred Pharmacy Pharmacy Name Phone [facility] [address] [place] [address], [address] [place] [address] [phone] Your Updated Medication List  
  
   
This list is accurate as of: 11/20/17  9:59 AM.  Always use your most recent med list.  
  
  
  
  
 albuterol 90 mcg/actuation inhaler Commonly known as:  PROAIR HFA  
 Take 2 Puffs by inhalation every four (4) hours as needed for Wheezing or Shortness of Breath. apixaban 5 mg tablet Commonly known as:  [place] Stade Take 5 mg by mouth two (2) times a day. azithromycin 250 mg tablet Commonly known as:  Unice Messing Take 2 tablets today, then take 1 tablet daily  
  
 fluticasone 110 mcg/actuation inhaler Commonly known as:  FLOVENT HFA Take 1 Puff by inhalation every twelve (12) hours. Indications: MAINTENANCE THERAPY FOR ASTHMA  
  
 levothyroxine 50 mcg tablet Commonly known as:  SYNTHROID Take 1 Tab by mouth Daily (before breakfast). lisinopril-hydroCHLOROthiazide 20-25 mg per tablet Commonly known as:  Dariela Hodgkin Take 1 Tab by mouth daily. meloxicam 15 mg tablet Commonly known as:  MOBIC Take 1 Tab by mouth daily. multivitamin tablet Commonly known as:  ONE A DAY Take 1 Tab by mouth daily. Prescriptions Sent to Pharmacy Refills  
 azithromycin (ZITHROMAX) 250 mg tablet 0 Sig: Take 2 tablets today, then take 1 tablet daily Class: Normal  
 Pharmacy: [address], [address] [facility]. Tanna Martinez 48 Ph #: [phone] We Performed the Following INFLUENZA VIRUS VAC QUAD,SPLIT,PRESV FREE SYRINGE IM [id number] CPT(R)] To-Do List   
 11/20/2017 Lab:  TSH AND FREE T4   
  
 03/19/2018 Lab:  LIPID PANEL   
  
 03/19/2018 Lab:  METABOLIC PANEL, COMPREHENSIVE Patient Instructions Please make sure to get lots of rest, drink plenty of fluids at least eight 8-ounce glasses daily. Please start Vitamin C supplement of 1000mg daily and a daily multivitamin with zinc in it or a zinc supplementation. Use warm mist vaporizer/ humidifier and Vicks vaporub around the nose to help open up your nasal passages. Delsym is a very good cough syrup. Use ibuprofen as needed for pain and fevers. Please follow up if you are not improving in 1 week or if your symptoms change. DASH Diet: Care Instructions Your Care Instructions The DASH diet is an eating plan that can help lower your blood pressure. DASH stands for Dietary Approaches to Stop Hypertension. Hypertension is high blood pressure. The DASH diet focuses on eating foods that are high in calcium, potassium, and magnesium. These nutrients can lower blood pressure. The foods that are highest in these nutrients are fruits, vegetables, low-fat dairy products, nuts, seeds, and legumes. But taking calcium, potassium, and magnesium supplements instead of eating foods that are high in those nutrients does not have the same effect. The DASH diet also includes whole grains, fish, and poultry. The DASH diet is one of several lifestyle changes your doctor may recommend to lower your high blood pressure. Your doctor may also want you to decrease the amount of sodium in your diet. Lowering sodium while following the DASH diet can lower blood pressure even further than just the DASH diet alone. Follow-up care is a key part of your treatment and safety. Be sure to make and go to all appointments, and call your doctor if you are having problems. It's also a good idea to know your test results and keep a list of the medicines you take. How can you care for yourself at home? Following the DASH diet · Eat 4 to 5 servings of fruit each day. A serving is 1 medium-sized piece of fruit, ½ cup chopped or canned fruit, 1/4 cup dried fruit, or 4 ounces (½ cup) of fruit juice. Choose fruit more often than fruit juice. · Eat 4 to 5 servings of vegetables each day. A serving is 1 cup of lettuce or raw leafy vegetables, ½ cup of chopped or cooked vegetables, or 4 ounces (½ cup) of vegetable juice. Choose vegetables more often than vegetable juice. · Get 2 to 3 servings of low-fat and fat-free dairy each day. A serving is 8 ounces of milk, 1 cup of yogurt, or 1 ½ ounces of cheese. · Eat 6 to 8 servings of grains each day. A serving is 1 slice of bread, 1 ounce of dry cereal, or ½ cup of cooked rice, pasta, or cooked cereal. Try to choose whole-grain products as much as possible. · Limit lean meat, poultry, and fish to 2 servings each day. A serving is 3 ounces, about the size of a deck of cards. · Eat 4 to 5 servings of nuts, seeds, and legumes (cooked dried beans, lentils, and split peas) each week. A serving is 1/3 cup of nuts, 2 tablespoons of seeds, or ½ cup of cooked beans or peas. · Limit fats and oils to 2 to 3 servings each day. A serving is 1 teaspoon of vegetable oil or 2 tablespoons of salad dressing. · Limit sweets and added sugars to 5 servings or less a week. A serving is 1 tablespoon jelly or jam, ½ cup sorbet, or 1 cup of lemonade. · Eat less than 2,300 milligrams (mg) of sodium a day. If you limit your sodium to 1,500 mg a day, you can lower your blood pressure even more. Tips for success · Start small. Do not try to make dramatic changes to your diet all at once. You might feel that you are missing out on your favorite foods and then be more likely to not follow the plan. Make small changes, and stick with them. Once those changes become habit, add a few more changes. · Try some of the following: ¨ Make it a goal to eat a fruit or vegetable at every meal and at snacks. This will make it easy to get the recommended amount of fruits and vegetables each day. ¨ Try yogurt topped with fruit and nuts for a snack or healthy dessert. ¨ Add lettuce, tomato, cucumber, and onion to sandwiches. ¨ Combine a ready-made pizza crust with low-fat mozzarella cheese and lots of vegetable toppings. Try using tomatoes, squash, spinach, broccoli, carrots, cauliflower, and onions. ¨ Have a variety of cut-up vegetables with a low-fat dip as an appetizer instead of chips and dip. ¨ Sprinkle sunflower seeds or chopped almonds over salads.  Or try adding chopped walnuts or almonds to cooked vegetables. ¨ Try some vegetarian meals using beans and peas. Add garbanzo or kidney beans to salads. Make burritos and tacos with mashed dyson beans or black beans. Where can you learn more? Go to http://ilir-gustavo.info/. Enter U234 in the search box to learn more about \"DASH Diet: Care Instructions. \" Current as of: September 21, 2016 Content Version: 11.4 © 9034-6224 [employer]. Care instructions adapted under license by [employer] (which disclaims liability or warranty for this information). If you have questions about a medical condition or this instruction, always ask your healthcare professional. [address] any warranty or liability for your use of this information. Introducing \[facility] [place] [facility]\"" & HEALTH SERVICES! Dear Riya Loya: Thank you for requesting a [facility] account. Our records indicate that you already have an active [facility] account. You can access your account anytime at [email or web address] Did you know that you can access your hospital and ER discharge instructions at any time in [facility]? You can also review all of your test results from your hospital stay or ER visit. Additional Information If you have questions, please visit the Frequently Asked Questions section of the [facility] website at [email or web address] Remember, [facility] is NOT to be used for urgent needs. For medical emergencies, dial 911. Now available from your iPhone and Android! Please provide this summary of care documentation to your next provider. Your primary care clinician is listed as Tripp Mehta. If you have any questions after today's visit, please call [phone].

## 2017-11-20 NOTE — PROGRESS NOTES
Hypertension Follow Up Progress Note      Today's Date:  2017   Patient's Name: Olena Rodriguez   Patient's :  1970     Subjective:     Olena Rodriguez is a 49 y.o. male who presents for follow up of hypertension. Diet and Lifestyle: generally follows a low sodium diet, sedentary, nonsmoker  Home BP Monitoring: is not measured at home    Cardiovascular ROS: taking medications as instructed, no medication side effects noted, no TIA's, no chest pain on exertion, no swelling of ankles. New concerns: Cold symptoms x 1 week, productive cough- light green sputum. Feeling chest congestion. 1 day of feeling feverish.       Recurrent low back pain-  Seeing Dr Alanna Varma, [facility]    Due for recheck on thyroid levels to ensure continued euthyroid after discontinuing levothyroxine    Review of Systems:   Gen- No weight loss, No Malaise, No fatigue  Eyes- No dipoplia, blurry vision  CVS- No Chest pain, no palpitations, no edema  Neuro- No headaches  Skin- No easy bruising, No rashes      Hemoglobin A1c   Date Value Ref Range Status   2017 6.2 (H) 4.2 - 5.6 % Final     Comment:     (NOTE)  HbA1C Interpretive Ranges  <5.7              Normal  5.7 - 6.4         Consider Prediabetes  >6.5              Consider Diabetes       BUN   Date Value Ref Range Status   2017 17 7.0 - 18 MG/DL Final     Creatinine   Date Value Ref Range Status   2017 0.88 0.6 - 1.3 MG/DL Final     GFR est AA   Date Value Ref Range Status   2017 >60 >60 ml/min/1.73m2 Final     Hemoglobin A1c   Date Value Ref Range Status   2017 6.2 (H) 4.2 - 5.6 % Final     Comment:     (NOTE)  HbA1C Interpretive Ranges  <5.7              Normal  5.7 - 6.4         Consider Prediabetes  >6.5              Consider Diabetes       BUN   Date Value Ref Range Status   2017 17 7.0 - 18 MG/DL Final     Creatinine   Date Value Ref Range Status   2017 0.88 0.6 - 1.3 MG/DL Final     GFR est AA   Date Value Ref Range Status 08/23/2017 >60 >60 ml/min/1.73m2 Final       Objective:     Visit Vitals    /78 (BP 1 Location: Left arm, BP Patient Position: Sitting)    Pulse 76    Temp 99.5 °F (37.5 °C) (Oral)    Resp 18    Ht 5' 10\" (1.778 m)    Wt 291 lb (132 kg)    SpO2 98%    BMI 41.75 kg/m2     Appearance: alert, well appearing, and in no distress and oriented to person, place, and time. Cardiovascular: Normal rate and regular rhythm, no gallop, no murmur., S1, S2 normal and no JVD   Pulmonary/Chest: Effort normal  No respiratory distress. + rales RLL  Lower Extremities (feet): warm, good capillary refill  Lab review: most recent lipid panel reviewed, showing LDL result does not yet meet goal.   CVS exam BP noted to be well controlled today in office,    Assessment/Plan:   hypertension stable. current treatment plan is effective, no change in therapy  lab results and schedule of future lab studies reviewed with patient. hypertension well controlled. current treatment plan is effective, no change in therapy. ICD-10-CM ICD-9-CM    1. Essential hypertension I10 401.9 LIPID PANEL      METABOLIC PANEL, COMPREHENSIVE   2. Acute bronchitis, unspecified organism J20.9 466.0 azithromycin (ZITHROMAX) 250 mg tablet   3. History of hypothyroidism Z86.39 V12.29 TSH AND FREE T4         Recommendations:  Limit sodium < 1500 mg/day  DASH diet  Wt reduction and maintenance  Exercise- 30 min 5 days/wk with intense 20 min 3 days/wk  Moderation of Alcohol intake: 1 serving/day, 5/wk -female, 2 servings/day  9/wk-male  Do not smoke  Avoid NSAIDs, pseudoephedrine, phenylephrine and herbal suppliments such as bitter orange, ginsing, shashi's wort, licorice, caffeine pills. Discussed with patient at length the need for blood pressure re-evaluation and management with primary care. Discussed the long term sequelae for elevated blood pressure including blindness, CVA, MI, and renal failure/ dialysis. Pt agrees to follow up as directed. [address], [place]

## 2018-02-21 ENCOUNTER — OFFICE VISIT (OUTPATIENT)
Dept: FAMILY MEDICINE CLINIC | Age: 48
End: 2018-02-21

## 2018-02-21 VITALS
HEIGHT: 70 IN | WEIGHT: 296 LBS | DIASTOLIC BLOOD PRESSURE: 66 MMHG | RESPIRATION RATE: 18 BRPM | HEART RATE: 58 BPM | SYSTOLIC BLOOD PRESSURE: 122 MMHG | TEMPERATURE: 98.9 F | BODY MASS INDEX: 42.37 KG/M2 | OXYGEN SATURATION: 98 %

## 2018-02-21 DIAGNOSIS — R73.03 PREDIABETES: ICD-10-CM

## 2018-02-21 DIAGNOSIS — E66.01 MORBID OBESITY (HCC): ICD-10-CM

## 2018-02-21 DIAGNOSIS — I10 ESSENTIAL HYPERTENSION: Primary | ICD-10-CM

## 2018-02-21 NOTE — PROGRESS NOTES
Hypertension Follow Up Progress Note      Today's Date:  2018   Patient's Name: Puneet Grijalva   Patient's :  1970     Subjective:     Puneet Grijalva is a 49 y.o. male who presents for follow up of hypertension. Diet and Lifestyle: generally follows a low sodium diet, nonsmoker, Uses stationary bike twice a week for 15 minutes  Home BP Monitoring: is not measured at home    Cardiovascular ROS: taking medications as instructed, no medication side effects noted, no TIA's, no chest pain on exertion, no dyspnea on exertion, no swelling of ankles. New concerns: We had discussed weight at previous visits, he reports that food portions are larger than they should be. Stress provokes unhealthy eating for him. He feels that he can make better choices in regards to this.         Review of Systems:       Hemoglobin A1c   Date Value Ref Range Status   2017 6.2 (H) 4.2 - 5.6 % Final     Comment:     (NOTE)  HbA1C Interpretive Ranges  <5.7              Normal  5.7 - 6.4         Consider Prediabetes  >6.5              Consider Diabetes       BUN   Date Value Ref Range Status   2017 17 7.0 - 18 MG/DL Final     Creatinine   Date Value Ref Range Status   2017 0.88 0.6 - 1.3 MG/DL Final     GFR est AA   Date Value Ref Range Status   2017 >60 >60 ml/min/1.73m2 Final     Hemoglobin A1c   Date Value Ref Range Status   2017 6.2 (H) 4.2 - 5.6 % Final     Comment:     (NOTE)  HbA1C Interpretive Ranges  <5.7              Normal  5.7 - 6.4         Consider Prediabetes  >6.5              Consider Diabetes       BUN   Date Value Ref Range Status   2017 17 7.0 - 18 MG/DL Final     Creatinine   Date Value Ref Range Status   2017 0.88 0.6 - 1.3 MG/DL Final     GFR est AA   Date Value Ref Range Status   2017 >60 >60 ml/min/1.73m2 Final       Objective:     Visit Vitals    /66 (BP 1 Location: Left arm, BP Patient Position: Sitting)    Pulse (!) 58    Temp 98.9 °F (37.2 °C) (Oral)    Resp 18    Ht 5' 10\" (1.778 m)    Wt 296 lb (134.3 kg)    SpO2 98%    BMI 42.47 kg/m2     Appearance: alert, well appearing, and in no distress and oriented to person, place, and time. Cardiovascular: Normal rate and regular rhythm, no gallop, no murmur., S1, S2 normal and no JVD   Pulmonary/Chest: Effort normal and breath sounds normal. No respiratory distress. No wheezes or rales  Lower Extremities (feet): warm, good capillary refill  Lab review: orders written for new lab studies as appropriate; see orders. CVS exam BP noted to be well controlled today in office,    Assessment/Plan:   hypertension stable. current treatment plan is effective, no change in therapy  lab results and schedule of future lab studies reviewed with patient. hypertension well controlled. current treatment plan is effective, no change in therapy. ICD-10-CM ICD-9-CM    1. Essential hypertension I10 401.9    2. Morbid obesity ([facility] [address] E66.01 278.01    3. Prediabetes R73.03 790.29      Reports hematologist at [facility] has drawn recent labs. Will obtain these results prior to ordering labs; likely will need lipid and A1c. This was discussed with patient. Recommendations:  Limit sodium < 1500 mg/day  DASH diet  Wt reduction and maintenance  Exercise- 30 min 5 days/wk with intense 20 min 3 days/wk  Moderation of Alcohol intake: 1 serving/day, 5/wk -female, 2 servings/day  9/wk-male  Do not smoke  Avoid NSAIDs, pseudoephedrine, phenylephrine and herbal suppliments such as bitter orange, ginsing, shashi's wort, licorice, caffeine pills. Discussed with patient at length the need for blood pressure re-evaluation and management with primary care. Discussed the long term sequelae for elevated blood pressure including blindness, CVA, MI, and renal failure/ dialysis. Pt agrees to follow up as directed. Follow-up Disposition:  Return in about 3 months (around 5/21/2018) for hypertension, weight.     Alhaji CASTRO Lisa Ro MD

## 2018-02-21 NOTE — PATIENT INSTRUCTIONS
Please engage in regular exercise. Moderate intensity aerobic exercise  (60-70% maximal heart rate) for at least 150 minutes per week spread over a minimum of 3 days. Do not take more than 2 days off from exercising. Activities such as brisk walking, jogging, bicycling and swimming. Please also engage in weight resistance exercises for at least 20 minutes twice a week. Please engage in practicing portion control per the plate method we discussed. Keep a low carbohydrate diet. 50% of your daily caloric intake. Also a high fiber diet of 30 grams daily. Body Mass Index: Care Instructions  Your Care Instructions    Body mass index (BMI) can help you see if your weight is raising your risk for health problems. It uses a formula to compare how much you weigh with how tall you are. · A BMI lower than 18.5 is considered underweight. · A BMI between 18.5 and 24.9 is considered healthy. · A BMI between 25 and 29.9 is considered overweight. A BMI of 30 or higher is considered obese. If your BMI is in the normal range, it means that you have a lower risk for weight-related health problems. If your BMI is in the overweight or obese range, you may be at increased risk for weight-related health problems, such as high blood pressure, heart disease, stroke, arthritis or joint pain, and diabetes. If your BMI is in the underweight range, you may be at increased risk for health problems such as fatigue, lower protection (immunity) against illness, muscle loss, bone loss, hair loss, and hormone problems. BMI is just one measure of your risk for weight-related health problems. You may be at higher risk for health problems if you are not active, you eat an unhealthy diet, or you drink too much alcohol or use tobacco products. Follow-up care is a key part of your treatment and safety. Be sure to make and go to all appointments, and call your doctor if you are having problems.  It's also a good idea to know your test results and keep a list of the medicines you take. How can you care for yourself at home? · Practice healthy eating habits. This includes eating plenty of fruits, vegetables, whole grains, lean protein, and low-fat dairy. · If your doctor recommends it, get more exercise. Walking is a good choice. Bit by bit, increase the amount you walk every day. Try for at least 30 minutes on most days of the week. · Do not smoke. Smoking can increase your risk for health problems. If you need help quitting, talk to your doctor about stop-smoking programs and medicines. These can increase your chances of quitting for good. · Limit alcohol to 2 drinks a day for men and 1 drink a day for women. Too much alcohol can cause health problems. If you have a BMI higher than 25  · Your doctor may do other tests to check your risk for weight-related health problems. This may include measuring the distance around your waist. A waist measurement of more than 40 inches in men or 35 inches in women can increase the risk of weight-related health problems. · Talk with your doctor about steps you can take to stay healthy or improve your health. You may need to make lifestyle changes to lose weight and stay healthy, such as changing your diet and getting regular exercise. If you have a BMI lower than 18.5  · Your doctor may do other tests to check your risk for health problems. · Talk with your doctor about steps you can take to stay healthy or improve your health. You may need to make lifestyle changes to gain or maintain weight and stay healthy, such as getting more healthy foods in your diet and doing exercises to build muscle. Where can you learn more? Go to http://ilir-gustavo.info/. Enter S176 in the search box to learn more about \"Body Mass Index: Care Instructions. \"  Current as of: October 13, 2016  Content Version: 11.4  © 8346-8861 [employer], [employer].  Care instructions adapted under license by [employer] (which disclaims liability or warranty for this information). If you have questions about a medical condition or this instruction, always ask your healthcare professional. [address] any warranty or liability for your use of this information.

## 2018-04-18 ENCOUNTER — CLINICAL SUPPORT (OUTPATIENT)
Dept: FAMILY MEDICINE CLINIC | Age: 48
End: 2018-04-18

## 2018-04-18 ENCOUNTER — HOSPITAL ENCOUNTER (OUTPATIENT)
Dept: LAB | Age: 48
Discharge: HOME | End: 2018-04-18
Payer: COMMERCIAL

## 2018-04-18 DIAGNOSIS — I10 ESSENTIAL HYPERTENSION: ICD-10-CM

## 2018-04-18 DIAGNOSIS — Z01.89 ROUTINE LAB DRAW: ICD-10-CM

## 2018-04-18 DIAGNOSIS — R73.09 ELEVATED GLUCOSE: Primary | ICD-10-CM

## 2018-04-18 DIAGNOSIS — R73.09 ELEVATED GLUCOSE: ICD-10-CM

## 2018-04-18 LAB
EST. AVERAGE GLUCOSE BLD GHB EST-MCNC: 131 MG/DL
HBA1C MFR BLD: 6.2 % (ref 4.2–5.6)

## 2018-04-18 PROCEDURE — 80061 LIPID PANEL: CPT | Performed by: FAMILY MEDICINE

## 2018-04-18 PROCEDURE — 83036 HEMOGLOBIN GLYCOSYLATED A1C: CPT | Performed by: FAMILY MEDICINE

## 2018-04-18 PROCEDURE — 80053 COMPREHEN METABOLIC PANEL: CPT | Performed by: FAMILY MEDICINE

## 2018-04-19 LAB
ALBUMIN SERPL-MCNC: 4.2 G/DL (ref 3.4–5)
ALBUMIN/GLOB SERPL: 1.4 {RATIO} (ref 0.8–1.7)
ALP SERPL-CCNC: 59 U/L (ref 45–117)
ALT SERPL-CCNC: 54 U/L (ref 16–61)
ANION GAP SERPL CALC-SCNC: 9 MMOL/L (ref 3–18)
AST SERPL-CCNC: 31 U/L (ref 15–37)
BILIRUB SERPL-MCNC: 0.5 MG/DL (ref 0.2–1)
BUN SERPL-MCNC: 17 MG/DL (ref 7–18)
BUN/CREAT SERPL: 19 (ref 12–20)
CALCIUM SERPL-MCNC: 9 MG/DL (ref 8.5–10.1)
CHLORIDE SERPL-SCNC: 107 MMOL/L (ref 100–108)
CHOLEST SERPL-MCNC: 166 MG/DL
CO2 SERPL-SCNC: 29 MMOL/L (ref 21–32)
CREAT SERPL-MCNC: 0.88 MG/DL (ref 0.6–1.3)
GLOBULIN SER CALC-MCNC: 2.9 G/DL (ref 2–4)
GLUCOSE SERPL-MCNC: 106 MG/DL (ref 74–99)
HDLC SERPL-MCNC: 35 MG/DL (ref 40–60)
HDLC SERPL: 4.7 {RATIO} (ref 0–5)
LDLC SERPL CALC-MCNC: 94.2 MG/DL (ref 0–100)
LIPID PROFILE,FLP: ABNORMAL
POTASSIUM SERPL-SCNC: 4.3 MMOL/L (ref 3.5–5.5)
PROT SERPL-MCNC: 7.1 G/DL (ref 6.4–8.2)
SODIUM SERPL-SCNC: 145 MMOL/L (ref 136–145)
TRIGL SERPL-MCNC: 184 MG/DL (ref ?–150)
VLDLC SERPL CALC-MCNC: 36.8 MG/DL

## 2018-06-11 ENCOUNTER — OFFICE VISIT (OUTPATIENT)
Dept: FAMILY MEDICINE CLINIC | Age: 48
End: 2018-06-11

## 2018-06-11 VITALS
DIASTOLIC BLOOD PRESSURE: 83 MMHG | BODY MASS INDEX: 40.52 KG/M2 | TEMPERATURE: 98.7 F | OXYGEN SATURATION: 97 % | SYSTOLIC BLOOD PRESSURE: 123 MMHG | RESPIRATION RATE: 16 BRPM | WEIGHT: 283 LBS | HEIGHT: 70 IN | HEART RATE: 58 BPM

## 2018-06-11 DIAGNOSIS — I10 ESSENTIAL HYPERTENSION: Primary | ICD-10-CM

## 2018-06-11 DIAGNOSIS — M62.830 BACK SPASM: ICD-10-CM

## 2018-06-11 DIAGNOSIS — E66.01 MORBID OBESITY (HCC): ICD-10-CM

## 2018-06-11 RX ORDER — METAXALONE 800 MG/1
800 TABLET ORAL 3 TIMES DAILY
Qty: 30 TAB | Refills: 1 | Status: SHIPPED | OUTPATIENT
Start: 2018-06-11 | End: 2018-10-18

## 2018-06-11 NOTE — PATIENT INSTRUCTIONS
Check out [address] for information on weight loss medication. Continue use of prinzide. Back Spasm: Care Instructions  Your Care Instructions  A back spasm is sudden tightness and pain in your back muscles. It may happen from overuse or an injury. Things like sleeping in an awkward way, bending, lifting, standing, or sitting can sometimes cause a spasm. But the cause isn't always clear. Home treatment includes using heat or ice, taking over-the-counter (OTC) pain medicines, and avoiding activities that may cause back pain. For a back spasm that doesn't get better with home care, your doctor may prescribe medicine. Treatments such as massage or manipulation may also help ease a back spasm. Your doctor may also suggest exercise or physical therapy to help improve strength and flexibility in your back muscles. In most cases, getting back to your normal activities is good foryour back. Just make sure to avoid doing things that make your pain worse. Follow-up care is a key part of your treatment and safety. Be sure to make and go to all appointments, and call your doctor if you are having problems. It's also a good idea to know your test results and keep a list of the medicines you take. How can you care for yourself at home? ? Heat, ice, and medicines  ? · To relieve pain, use heat or ice (whichever feels better) on the affected area. ¨ Put a warm water bottle, a heating pad set on low, or a warm cloth on your back. Put a thin cloth between the heating pad and your skin. Do not go to sleep with a heating pad on your skin. ¨ Try ice or a cold pack on the area for 10 to 20 minutes at a time. Put a thin cloth between the ice and your skin. ? · Ask your doctor if you can take acetaminophen (such as Tylenol) or nonsteroidal anti-inflammatory drugs, such as ibuprofen or naproxen. Your doctor can prescribe stronger medicines if needed. Be safe with medicines. Read and follow all instructions on the label. ?Body positions and posture  ? · Sit or lie in positions that are most comfortable for you and that reduce pain. Try one of these positions when you lie down:  ¨ Lie on your back with your knees bent and supported by large pillows. ¨ Lie on the floor with your legs on the seat of a sofa or chair. Pavo Ebbing on your side with your knees and hips bent and a pillow between your legs. ¨ Lie on your stomach if it does not make pain worse. ? · Do not sit up in bed. Avoid soft couches and twisted positions. ? · Avoid bed rest after the first day of back pain. Bed rest can help relieve pain at first, but it delays healing. Continued rest without activity is usually not good for your back. ? · If you must sit for long periods of time, take breaks from sitting. Change positions every 30 minutes. Get up and walk around, or lie in a comfortable position. Activity  ? · Take short walks several times a day. You can start with 5 to 10 minutes, 3 or 4 times a day, and work up to longer walks. Walk on level surfaces and avoid hills and stairs until your back starts to feel better. ? · After your back spasm starts to feel better, try to stretch your muscles every day, especially before and after exercise and at bedtime. Regular stretching can help relax your muscles. ? · To prevent future back pain, do exercises to stretch and strengthen your back and stomach. Learn to use good posture, safe lifting techniques, and other ways to move to help you avoid back pain. When should you call for help? Call 911 anytime you think you may need emergency care. For example, call if:  ? · You are unable to move an arm or a leg at all. ?Call your doctor now or seek immediate medical care if:  ? · You have new or worse symptoms in your legs, belly, or buttocks. Symptoms may include:  ¨ Numbness or tingling. ¨ Weakness. ¨ Pain. ? · You lose bladder or bowel control. ? Watch closely for changes in your health, and be sure to contact your doctor if:  ? · You do not get better as expected. Where can you learn more? Go to http://ilir-gustavo.info/. Enter E232 in the search box to learn more about \"Back Spasm: Care Instructions. \"  Current as of: March 21, 2017  Content Version: 11.4  © 5789-0519 [employer]. Care instructions adapted under license by [employer] (which disclaims liability or warranty for this information). If you have questions about a medical condition or this instruction, always ask your healthcare professional. [address] any warranty or liability for your use of this information.

## 2018-06-11 NOTE — MR AVS SNAPSHOT
[facility] [address] [address] 
[phone] Patient: Bethany Estrella MRN: [id number] BBG:[id number] Visit Information Date & Time Provider Department Dept. Phone Encounter #  
 6/11/2018  3:45 PM Asiya Moss [address] [place] [address] [phone] [id number] Follow-up Instructions Return in about 4 months (around 10/11/2018) for hypertension. Upcoming Health Maintenance Date Due Pneumococcal 19-64 Medium Risk (1 of 1 - PPSV23) 4/13/1988 DTaP/Tdap/Td series (1 - Tdap) 4/13/1990 Influenza Age 5 to Adult 8/1/2018 Allergies as of 6/11/2018  Review Complete On: 6/11/2018 By: Wu Fink Severity Noted Reaction Type Reactions Codeine  11/03/2008    Other (comments), Hives Current Immunizations  Reviewed on 11/20/2017 Name Date Influenza Vaccine 10/1/2011 12:00 AM, 11/10/2010 12:00 AM  
 Influenza Vaccine (Quad) PF 11/20/2017  9:53 AM  
  
 Not reviewed this visit You Were Diagnosed With   
  
 Codes Comments Essential hypertension    -  Primary ICD-10-CM: I10 
ICD-9-CM: 401.9 Back spasm     ICD-10-CM: G24.642 ICD-9-CM: 724.8 Morbid obesity ([address]     ICD-10-CM: E66.01 
ICD-9-CM: 278.01 Vitals BP Pulse Temp Resp Height(growth percentile) Weight(growth percentile) 123/83 (BP 1 Location: Left arm, BP Patient Position: Sitting) (!) 58 98.7 °F (37.1 °C) (Oral) 16 5' 10\" (1.778 m) 283 lb (128.4 kg) SpO2 BMI Smoking Status 97% 40.61 kg/m2 Never Smoker Vitals History BMI and BSA Data Body Mass Index Body Surface Area  
 40.61 kg/m 2 2.52 m 2 Preferred Pharmacy Pharmacy Name Phone [address] [place], [address] [facility]. [phone] Your Updated Medication List  
  
   
This list is accurate as of 6/11/18  4:31 PM.  Always use your most recent med list.  
  
  
  
  
 albuterol 90 mcg/actuation inhaler Commonly known as:  PROAIR HFA Take 2 Puffs by inhalation every four (4) hours as needed for Wheezing or Shortness of Breath. fluticasone 110 mcg/actuation inhaler Commonly known as:  FLOVENT HFA Take 1 Puff by inhalation every twelve (12) hours. Indications: MAINTENANCE THERAPY FOR ASTHMA  
  
 lisinopril-hydroCHLOROthiazide 20-25 mg per tablet Commonly known as:  PRINZIDE, ZESTORETIC  
TAKE ONE TABLET BY MOUTH ONE TIME DAILY  
  
 metaxalone 800 mg tablet Commonly known as:  SKELAXIN Take 1 Tab by mouth three (3) times daily. multivitamin tablet Commonly known as:  ONE A DAY Take 1 Tab by mouth daily. Prescriptions Sent to Pharmacy Refills  
 metaxalone (SKELAXIN) 800 mg tablet 1 Sig: Take 1 Tab by mouth three (3) times daily. Class: Normal  
 Pharmacy: [address] [place] [address], [address] [place] [address],[address].  #: [phone] Route: Oral  
  
Follow-up Instructions Return in about 4 months (around 10/11/2018) for hypertension. Patient Instructions Check out [employer] for information on weight loss medication. Continue use of prinzide. Back Spasm: Care Instructions Your Care Instructions A back spasm is sudden tightness and pain in your back muscles. It may happen from overuse or an injury. Things like sleeping in an awkward way, bending, lifting, standing, or sitting can sometimes cause a spasm. But the cause isn't always clear. Home treatment includes using heat or ice, taking over-the-counter (OTC) pain medicines, and avoiding activities that may cause back pain. For a back spasm that doesn't get better with home care, your doctor may prescribe medicine. Treatments such as massage or manipulation may also help ease a back spasm. Your doctor may also suggest exercise or physical therapy to help improve strength and flexibility in your back muscles. In most cases, getting back to your normal activities is good foryour back. Just make sure to avoid doing things that make your pain worse. Follow-up care is a key part of your treatment and safety. Be sure to make and go to all appointments, and call your doctor if you are having problems. It's also a good idea to know your test results and keep a list of the medicines you take. How can you care for yourself at home? ? Heat, ice, and medicines ? · To relieve pain, use heat or ice (whichever feels better) on the affected area. ¨ Put a warm water bottle, a heating pad set on low, or a warm cloth on your back. Put a thin cloth between the heating pad and your skin. Do not go to sleep with a heating pad on your skin. ¨ Try ice or a cold pack on the area for 10 to 20 minutes at a time. Put a thin cloth between the ice and your skin. ? · Ask your doctor if you can take acetaminophen (such as Tylenol) or nonsteroidal anti-inflammatory drugs, such as ibuprofen or naproxen. Your doctor can prescribe stronger medicines if needed. Be safe with medicines. Read and follow all instructions on the label. ?Body positions and posture ? · Sit or lie in positions that are most comfortable for you and that reduce pain. Try one of these positions when you lie down: ¨ Lie on your back with your knees bent and supported by large pillows. ¨ Lie on the floor with your legs on the seat of a sofa or chair. Arn Closs on your side with your knees and hips bent and a pillow between your legs. ¨ Lie on your stomach if it does not make pain worse. ? · Do not sit up in bed. Avoid soft couches and twisted positions. ? · Avoid bed rest after the first day of back pain. Bed rest can help relieve pain at first, but it delays healing. Continued rest without activity is usually not good for your back. ? · If you must sit for long periods of time, take breaks from sitting. Change positions every 30 minutes. Get up and walk around, or lie in a comfortable position. Activity ? · Take short walks several times a day. You can start with 5 to 10 minutes, 3 or 4 times a day, and work up to longer walks. Walk on level surfaces and avoid hills and stairs until your back starts to feel better. ? · After your back spasm starts to feel better, try to stretch your muscles every day, especially before and after exercise and at bedtime. Regular stretching can help relax your muscles. ? · To prevent future back pain, do exercises to stretch and strengthen your back and stomach. Learn to use good posture, safe lifting techniques, and other ways to move to help you avoid back pain. When should you call for help? Call 911 anytime you think you may need emergency care. For example, call if: 
? · You are unable to move an arm or a leg at all. ?Call your doctor now or seek immediate medical care if: 
? · You have new or worse symptoms in your legs, belly, or buttocks. Symptoms may include: ¨ Numbness or tingling. ¨ Weakness. ¨ Pain. ? · You lose bladder or bowel control. ? Watch closely for changes in your health, and be sure to contact your doctor if: 
? · You do not get better as expected. Where can you learn more? Go to http://ilir-gustavo.info/. Enter E232 in the search box to learn more about \"Back Spasm: Care Instructions. \" Current as of: March 21, 2017 Content Version: 11.4 © 4824-1885 Intellecap. Care instructions adapted under license by Pro[employer]ix (which disclaims liability or warranty for this information). If you have questions about a medical condition or this instruction, always ask your healthcare professional. [place] [address] any warranty or liability for your use of this information. Introducing [facility]! Dear Branda Brunner: Thank you for requesting a DesignGooroo account. Our records indicate that you already have an active DesignGooroo account. You can access your account anytime at [email or web address] Did you know that you can access your hospital and ER discharge instructions at any time in DesignGooroo? You can also review all of your test results from your hospital stay or ER visit. Additional Information If you have questions, please visit the Frequently Asked Questions section of the DesignGooroo website at [email or web address] Remember, DesignGooroo is NOT to be used for urgent needs. For medical emergencies, dial 911. Now available from your iPhone and Android! Please provide this summary of care documentation to your next provider. Your primary care clinician is listed as [address] [place] [address]. If you have any questions after today's visit, please call [phone].

## 2018-06-11 NOTE — PROGRESS NOTES
Chief Complaint   Patient presents with    Hypertension     follow up    Weight Management     follow up     1. Have you been to the ER, urgent care clinic since your last visit? Hospitalized since your last visit? No    2. Have you seen or consulted any other health care providers outside of the [address] [place] [address] since your last visit? Include any pap smears or colon screening.  No
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
## 2018-08-10 DIAGNOSIS — I10 ESSENTIAL HYPERTENSION: ICD-10-CM

## 2018-08-13 RX ORDER — LISINOPRIL AND HYDROCHLOROTHIAZIDE 20; 25 MG/1; MG/1
TABLET ORAL
Qty: 90 TAB | Refills: 0 | Status: SHIPPED | OUTPATIENT
Start: 2018-08-13 | End: 2018-10-18 | Stop reason: SDUPTHER

## 2018-10-18 ENCOUNTER — OFFICE VISIT (OUTPATIENT)
Dept: FAMILY MEDICINE CLINIC | Age: 48
End: 2018-10-18

## 2018-10-18 VITALS
TEMPERATURE: 97.3 F | BODY MASS INDEX: 40.52 KG/M2 | WEIGHT: 283 LBS | HEART RATE: 58 BPM | RESPIRATION RATE: 20 BRPM | SYSTOLIC BLOOD PRESSURE: 117 MMHG | HEIGHT: 70 IN | DIASTOLIC BLOOD PRESSURE: 70 MMHG | OXYGEN SATURATION: 99 %

## 2018-10-18 DIAGNOSIS — Z23 ENCOUNTER FOR IMMUNIZATION: ICD-10-CM

## 2018-10-18 DIAGNOSIS — E66.01 OBESITY, MORBID (HCC): ICD-10-CM

## 2018-10-18 DIAGNOSIS — I10 ESSENTIAL HYPERTENSION: Primary | ICD-10-CM

## 2018-10-18 DIAGNOSIS — S39.012A BACK STRAIN, INITIAL ENCOUNTER: ICD-10-CM

## 2018-10-18 RX ORDER — CYCLOBENZAPRINE HCL 10 MG
TABLET ORAL
Qty: 30 TAB | Refills: 1 | Status: SHIPPED | OUTPATIENT
Start: 2018-10-18 | End: 2018-11-06 | Stop reason: SDUPTHER

## 2018-10-18 RX ORDER — LISINOPRIL AND HYDROCHLOROTHIAZIDE 20; 25 MG/1; MG/1
TABLET ORAL
Qty: 90 TAB | Refills: 2 | Status: SHIPPED | OUTPATIENT
Start: 2018-10-18

## 2018-10-18 NOTE — PATIENT INSTRUCTIONS
Heating pad for 10 minutes twice daily. Use flexeril three times daily as needed. Back Strain: Care Instructions  Overview    A back strain happens when you overstretch, or pull, a muscle in your back. You may hurt your back in an accident or when you exercise or lift something. Sometimes you may not know how you hurt your back. Most back pain will get better with rest and time. You can take care of yourself at home to help your back heal.  Follow-up care is a key part of your treatment and safety. Be sure to make and go to all appointments, and call your doctor if you are having problems. It's also a good idea to know your test results and keep a list of the medicines you take. How can you care for yourself at home? · Try to stay as active as you can, but stop or reduce any activity that causes pain. · Put ice or a cold pack on the sore muscle for 10 to 20 minutes at a time to stop swelling. Try this every 1 to 2 hours for 3 days (when you are awake) or until the swelling goes down. Put a thin cloth between the ice pack and your skin. · After 2 or 3 days, apply a heating pad on low or a warm cloth to your back. Some doctors suggest that you go back and forth between hot and cold treatments. · Take pain medicines exactly as directed. ? If the doctor gave you a prescription medicine for pain, take it as prescribed. ? If you are not taking a prescription pain medicine, ask your doctor if you can take an over-the-counter medicine. · Try sleeping on your side with a pillow between your legs. Or put a pillow under your knees when you lie on your back. These measures can ease pain in your lower back. · Return to your usual level of activity slowly. When should you call for help? Call 911 anytime you think you may need emergency care.  For example, call if:    · You are unable to move a leg at all.   [facility] your doctor now or seek immediate medical care if:    · You have new or worse symptoms in your legs, belly, or buttocks. Symptoms may include:  ? Numbness or tingling. ? Weakness. ? Pain.     · You lose bladder or bowel control.    Watch closely for changes in your health, and be sure to contact your doctor if:    · You have a fever, lose weight, or don't feel well.     · You are not getting better as expected. Where can you learn more? Go to http://ilir-gustavo.info/. Enter K670 in the search box to learn more about \"Back Strain: Care Instructions. \"  Current as of: November 29, 2017  Content Version: 11.8  © 9050-5253 [employer]. Care instructions adapted under license by Clarisonic (which disclaims liability or warranty for this information). If you have questions about a medical condition or this instruction, always ask your healthcare professional. [address] any warranty or liability for your use of this information.

## 2018-10-18 NOTE — PROGRESS NOTES
Hypertension Follow Up Progress Note      Today's Date:  10/18/2018   Patient's Name: Kerrie Iniguez   Patient's :  1970     Subjective:     Kerrie Iniguez is a 51 y.o. male who presents for follow up of hypertension. Diet and Lifestyle: generally follows a low fat low cholesterol diet, exercises sporadically, nonsmoker  Home BP Monitoring: is not measured at home    Cardiovascular ROS: taking medications as instructed, no medication side effects noted, no TIA's, no chest pain on exertion, no dyspnea on exertion, no swelling of ankles. New concerns: Twisted low back on  while moving a 40# pump. Hx of recurrent low back pain.  Denies radiation of pain down legs, no loss of bladder/bowel control, no saddle paresthesia    Review of Systems:   Gen- No weight loss, No Malaise, No fatigue  Eyes- No dipoplia, blurry vision  CVS- No Chest pain, no palpitations, no edema  Resp- No Cough, SOB, OJEDA, Wheezing  Neuro- No headaches  Skin- No easy bruising, No rashes      Hemoglobin A1c   Date Value Ref Range Status   2018 6.2 (H) 4.2 - 5.6 % Final     Comment:     (NOTE)  HbA1C Interpretive Ranges  <5.7              Normal  5.7 - 6.4         Consider Prediabetes  >6.5              Consider Diabetes       BUN   Date Value Ref Range Status   2018 17 7.0 - 18 MG/DL Final     Creatinine   Date Value Ref Range Status   2018 0.88 0.6 - 1.3 MG/DL Final     GFR est AA   Date Value Ref Range Status   2018 >60 >60 ml/min/1.73m2 Final     Hemoglobin A1c   Date Value Ref Range Status   2018 6.2 (H) 4.2 - 5.6 % Final     Comment:     (NOTE)  HbA1C Interpretive Ranges  <5.7              Normal  5.7 - 6.4         Consider Prediabetes  >6.5              Consider Diabetes       BUN   Date Value Ref Range Status   2018 17 7.0 - 18 MG/DL Final     Creatinine   Date Value Ref Range Status   2018 0.88 0.6 - 1.3 MG/DL Final     GFR est AA   Date Value Ref Range Status   2018 >60 >60 ml/min/1.73m2 Final       Objective:     Visit Vitals  /70 (BP 1 Location: Right arm, BP Patient Position: Sitting)   Pulse (!) 58   Temp 97.3 °F (36.3 °C) (Oral)   Resp 20   Ht 5' 10\" (1.778 m)   Wt 283 lb (128.4 kg)   SpO2 99%   BMI 40.61 kg/m²     Appearance: alert, well appearing, and in no distress and oriented to person, place, and time. Cardiovascular: Normal rate and regular rhythm, no gallop, no murmur., S1, S2 normal and no JVD   Pulmonary/Chest: Effort normal and breath sounds normal. No respiratory distress. No wheezes or rales  MSK: lumbar spine exam- normal curvature, no spinal tenderness, spasm noted of right/ left paraspinals L3-4 level, AROM normal, heel/ toe walk intact, patellar reflex 1+ b/L, achilles reflex 2+ b/L  Lower Extremities (feet): warm, good capillary refill  Lab review: Up to date and normal  CVS exam BP noted to be well controlled today in office,        Assessment/Plan:   hypertension stable. current treatment plan is effective, no change in therapy  lab results and schedule of future lab studies reviewed with patient. hypertension well controlled. current treatment plan is effective, no change in therapy. ICD-10-CM ICD-9-CM    1. Essential hypertension I10 401.9 lisinopril-hydroCHLOROthiazide (PRINZIDE, ZESTORETIC) 20-25 mg per tablet   2. Back strain, initial encounter S39.012A 847.9 cyclobenzaprine (FLEXERIL) 10 mg tablet      AMB SUPPLY ORDER   3. Obesity, morbid ([address] E66.01 278.01    4. Encounter for immunization Z23 V03.89 INFLUENZA VIRUS VAC QUAD,SPLIT,PRESV FREE SYRINGE IM     1. Blood pressure well controlled. Labs up to date. LDL at goal. Recheck at next visit. Continue current medications. 2. Back strain- Advised heating pad, stretching exercises, flexeril PRN    3. Discussed weight loss medication- specifically Saxenda. He will check with his insurance on this.     4. Flu shot given today    Recommendations:  Limit sodium < 1500 mg/day  DASH diet  Wt reduction and maintenance  Exercise- 30 min 5 days/wk with intense 20 min 3 days/wk  Moderation of Alcohol intake: 1 serving/day, 5/wk -female, 2 servings/day  9/wk-male  Do not smoke  Avoid NSAIDs, pseudoephedrine, phenylephrine and herbal suppliments such as bitter orange, ginsing, shashi's wort, licorice, caffeine pills. Discussed with patient at length the need for blood pressure re-evaluation and management with primary care. Discussed the long term sequelae for elevated blood pressure including blindness, CVA, MI, and renal failure/ dialysis. Pt agrees to follow up as directed. Follow-up Disposition: Not on File      Jo Stratton MD    Discussed the patient's BMI with him. The BMI follow up plan is as follows:     dietary management education, guidance, and counseling  encourage exercise  monitor weight  prescribed dietary intake    An After Visit Summary was printed and given to the patient.

## 2018-10-18 NOTE — PROGRESS NOTES
Jf Araiza is a 51 y.o. male pesented in clinic today for   Chief Complaint   Patient presents with    Hypertension     follow up   [address]     1. Have you been to the ER, urgent care clinic since your last visit? Hospitalized since your last visit? No    2. Have you seen or consulted any other health care providers outside of the [address] [place] [address] since your last visit? Include any pap smears or colon screening.  No

## 2018-11-06 DIAGNOSIS — S39.012A BACK STRAIN, INITIAL ENCOUNTER: ICD-10-CM

## 2018-11-06 RX ORDER — CYCLOBENZAPRINE HCL 10 MG
TABLET ORAL
Qty: 30 TAB | Refills: 1 | Status: SHIPPED | OUTPATIENT
Start: 2018-11-06 | End: 2018-12-05 | Stop reason: SDUPTHER

## 2018-12-05 DIAGNOSIS — S39.012A BACK STRAIN, INITIAL ENCOUNTER: ICD-10-CM

## 2018-12-05 RX ORDER — CYCLOBENZAPRINE HCL 10 MG
TABLET ORAL
Qty: 30 TAB | Refills: 1 | Status: SHIPPED | OUTPATIENT
Start: 2018-12-05

## 2020-08-02 PROBLEM — J45.909 ASTHMA: Status: ACTIVE | Noted: 2020-08-02

## 2020-08-02 PROBLEM — J18.9 BILATERAL PNEUMONIA: Status: ACTIVE | Noted: 2020-08-02

## 2020-08-02 PROBLEM — U07.1 PNEUMONIA DUE TO COVID-19 VIRUS: Status: ACTIVE | Noted: 2020-08-02

## 2020-08-02 PROBLEM — J12.82 PNEUMONIA DUE TO COVID-19 VIRUS: Status: ACTIVE | Noted: 2020-08-02

## 2020-08-02 PROBLEM — Z20.822 SUSPECTED COVID-19 VIRUS INFECTION: Status: ACTIVE | Noted: 2020-08-02

## 2022-03-18 PROBLEM — I10 ESSENTIAL HYPERTENSION: Status: ACTIVE | Noted: 2017-08-20

## 2022-03-18 PROBLEM — E66.01 OBESITY, MORBID (HCC): Status: ACTIVE | Noted: 2018-02-21

## 2022-03-19 PROBLEM — J45.909 ASTHMA: Status: ACTIVE | Noted: 2020-08-02

## 2022-03-19 PROBLEM — J18.9 BILATERAL PNEUMONIA: Status: ACTIVE | Noted: 2020-08-02

## 2022-03-19 PROBLEM — Z20.822 SUSPECTED COVID-19 VIRUS INFECTION: Status: ACTIVE | Noted: 2020-08-02

## 2022-03-19 PROBLEM — G89.29 CHRONIC LOW BACK PAIN WITHOUT SCIATICA: Status: ACTIVE | Noted: 2017-08-20

## 2022-03-19 PROBLEM — M54.50 CHRONIC LOW BACK PAIN WITHOUT SCIATICA: Status: ACTIVE | Noted: 2017-08-20

## 2022-03-20 PROBLEM — J12.82 PNEUMONIA DUE TO COVID-19 VIRUS: Status: ACTIVE | Noted: 2020-08-02

## 2022-03-20 PROBLEM — U07.1 PNEUMONIA DUE TO COVID-19 VIRUS: Status: ACTIVE | Noted: 2020-08-02

## 2022-08-25 ENCOUNTER — TRANSCRIBE ORDER (OUTPATIENT)
Dept: REGISTRATION | Age: 52
End: 2022-08-25

## 2022-08-25 ENCOUNTER — HOSPITAL ENCOUNTER (OUTPATIENT)
Dept: GENERAL RADIOLOGY | Age: 52
Discharge: HOME | End: 2022-08-25
Attending: STUDENT IN AN ORGANIZED HEALTH CARE EDUCATION/TRAINING PROGRAM
Payer: COMMERCIAL

## 2022-08-25 ENCOUNTER — HOSPITAL ENCOUNTER (OUTPATIENT)
Dept: ULTRASOUND IMAGING | Age: 52
Discharge: HOME | End: 2022-08-25
Attending: STUDENT IN AN ORGANIZED HEALTH CARE EDUCATION/TRAINING PROGRAM
Payer: COMMERCIAL

## 2022-08-25 DIAGNOSIS — C77.4 MALIGNANT NEOPLASM METASTATIC TO INGUINAL LYMPH NODE (HCC): ICD-10-CM

## 2022-08-25 DIAGNOSIS — N50.89 MASS OF LEFT TESTICLE: ICD-10-CM

## 2022-08-25 DIAGNOSIS — C77.4 SECONDARY MALIGNANT NEOPLASM OF FEMORAL LYMPH NODE (HCC): Primary | ICD-10-CM

## 2022-08-25 PROCEDURE — 93975 VASCULAR STUDY: CPT

## 2022-08-25 PROCEDURE — 71046 X-RAY EXAM CHEST 2 VIEWS: CPT

## 2022-08-26 NOTE — PROGRESS NOTES
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 We will proceed with left inguinal radical orchiectomy. I called the patient and spoke to him directly to review the results and the plan. 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 He understands the rationale, risk, benefits of surgery and is willing to proceed.

## 2022-08-30 ENCOUNTER — HOSPITAL ENCOUNTER (OUTPATIENT)
Dept: LAB | Age: 52
Discharge: HOME | End: 2022-08-30
Payer: COMMERCIAL

## 2022-08-30 LAB — LDH SERPL L TO P-CCNC: 165 U/L (ref 81–234)

## 2022-08-30 PROCEDURE — 36415 COLL VENOUS BLD VENIPUNCTURE: CPT

## 2022-08-30 PROCEDURE — 83615 LACTATE (LD) (LDH) ENZYME: CPT

## 2022-08-30 RX ORDER — ENOXAPARIN SODIUM 150 MG/ML
120 INJECTION SUBCUTANEOUS DAILY
Qty: 4.8 ML | Refills: 0 | Status: SHIPPED | OUTPATIENT
Start: 2022-09-01 | End: 2022-08-31 | Stop reason: SDUPTHER

## 2022-08-31 DIAGNOSIS — I10 ESSENTIAL HYPERTENSION: ICD-10-CM

## 2022-08-31 DIAGNOSIS — N50.89 TESTICULAR MASS: Primary | ICD-10-CM

## 2022-08-31 RX ORDER — OXYCODONE HYDROCHLORIDE 5 MG/1
5 TABLET ORAL
Qty: 10 TABLET | Refills: 0 | Status: CANCELLED | OUTPATIENT
Start: 2022-08-31 | End: 2022-09-03

## 2022-08-31 RX ORDER — ENOXAPARIN SODIUM 150 MG/ML
120 INJECTION SUBCUTANEOUS DAILY
Qty: 4.8 ML | Refills: 0 | Status: SHIPPED | OUTPATIENT
Start: 2022-09-01 | End: 2022-09-07

## 2022-08-31 RX ORDER — OXYCODONE HYDROCHLORIDE 5 MG/1
5 TABLET ORAL
Qty: 15 TABLET | Refills: 0 | Status: SHIPPED | OUTPATIENT
Start: 2022-08-31 | End: 2022-09-07

## 2022-09-12 ENCOUNTER — HOSPITAL ENCOUNTER (OUTPATIENT)
Dept: LAB | Age: 52
Discharge: HOME | End: 2022-09-12
Payer: COMMERCIAL

## 2022-09-12 LAB — LDLC SERPL DIRECT ASSAY-MCNC: 103 MG/DL (ref 0–100)

## 2022-09-12 PROCEDURE — 83721 ASSAY OF BLOOD LIPOPROTEIN: CPT

## 2022-09-12 PROCEDURE — 36415 COLL VENOUS BLD VENIPUNCTURE: CPT

## 2022-09-12 PROCEDURE — 82105 ALPHA-FETOPROTEIN SERUM: CPT

## 2022-09-12 PROCEDURE — 84702 CHORIONIC GONADOTROPIN TEST: CPT

## 2022-09-13 LAB
AFP-TM SERPL-MCNC: 2.3 NG/ML (ref 0–8.4)
HCG INTACT+B SERPL-ACNC: 55 MIU/ML (ref 0–3)

## 2023-01-31 DIAGNOSIS — C77.4 SECONDARY MALIGNANT NEOPLASM OF FEMORAL LYMPH NODE (HCC): Primary | ICD-10-CM

## 2023-02-03 DIAGNOSIS — C77.4 SECONDARY MALIGNANT NEOPLASM OF FEMORAL LYMPH NODE (HCC): Primary | ICD-10-CM
